# Patient Record
Sex: FEMALE | Race: WHITE | NOT HISPANIC OR LATINO
[De-identification: names, ages, dates, MRNs, and addresses within clinical notes are randomized per-mention and may not be internally consistent; named-entity substitution may affect disease eponyms.]

---

## 2017-02-02 ENCOUNTER — APPOINTMENT (OUTPATIENT)
Dept: ENDOCRINOLOGY | Facility: CLINIC | Age: 42
End: 2017-02-02

## 2017-02-02 VITALS
SYSTOLIC BLOOD PRESSURE: 128 MMHG | HEIGHT: 65 IN | WEIGHT: 156 LBS | DIASTOLIC BLOOD PRESSURE: 87 MMHG | BODY MASS INDEX: 25.99 KG/M2 | HEART RATE: 92 BPM

## 2017-02-24 ENCOUNTER — RX RENEWAL (OUTPATIENT)
Age: 42
End: 2017-02-24

## 2017-02-24 ENCOUNTER — TRANSCRIPTION ENCOUNTER (OUTPATIENT)
Age: 42
End: 2017-02-24

## 2017-02-27 ENCOUNTER — TRANSCRIPTION ENCOUNTER (OUTPATIENT)
Age: 42
End: 2017-02-27

## 2017-02-28 ENCOUNTER — TRANSCRIPTION ENCOUNTER (OUTPATIENT)
Age: 42
End: 2017-02-28

## 2017-02-28 ENCOUNTER — RX RENEWAL (OUTPATIENT)
Age: 42
End: 2017-02-28

## 2017-02-28 RX ORDER — INFUSION SET FOR INSULIN PUMP
INFUSION SETS-PARAPHERNALIA MISCELLANEOUS
Qty: 45 | Refills: 3 | Status: ACTIVE | COMMUNITY
Start: 2017-02-28 | End: 1900-01-01

## 2017-02-28 RX ORDER — SUB-Q INFUSION PUMP ACCESSORY
EACH MISCELLANEOUS
Qty: 45 | Refills: 3 | Status: ACTIVE | COMMUNITY
Start: 2017-02-28 | End: 1900-01-01

## 2017-06-12 ENCOUNTER — APPOINTMENT (OUTPATIENT)
Dept: OBGYN | Facility: CLINIC | Age: 42
End: 2017-06-12

## 2017-06-12 VITALS
DIASTOLIC BLOOD PRESSURE: 80 MMHG | WEIGHT: 156 LBS | SYSTOLIC BLOOD PRESSURE: 130 MMHG | HEIGHT: 65 IN | BODY MASS INDEX: 25.99 KG/M2

## 2017-06-12 DIAGNOSIS — Z31.9 ENCOUNTER FOR PROCREATIVE MANAGEMENT, UNSPECIFIED: ICD-10-CM

## 2017-06-15 ENCOUNTER — APPOINTMENT (OUTPATIENT)
Dept: ENDOCRINOLOGY | Facility: CLINIC | Age: 42
End: 2017-06-15

## 2017-06-15 VITALS
BODY MASS INDEX: 25.72 KG/M2 | DIASTOLIC BLOOD PRESSURE: 82 MMHG | HEART RATE: 94 BPM | SYSTOLIC BLOOD PRESSURE: 120 MMHG | WEIGHT: 156.25 LBS | HEIGHT: 65.5 IN

## 2017-06-15 DIAGNOSIS — E53.8 DEFICIENCY OF OTHER SPECIFIED B GROUP VITAMINS: ICD-10-CM

## 2017-06-15 DIAGNOSIS — O03.9 COMPLETE OR UNSPECIFIED SPONTANEOUS ABORTION W/OUT COMPLICATION: ICD-10-CM

## 2017-06-19 ENCOUNTER — RX RENEWAL (OUTPATIENT)
Age: 42
End: 2017-06-19

## 2017-06-19 ENCOUNTER — TRANSCRIPTION ENCOUNTER (OUTPATIENT)
Age: 42
End: 2017-06-19

## 2017-06-19 LAB — ANTI-MUELLERIAN HORMONE: 0.23 NG/ML

## 2017-06-20 ENCOUNTER — TRANSCRIPTION ENCOUNTER (OUTPATIENT)
Age: 42
End: 2017-06-20

## 2017-06-22 ENCOUNTER — TRANSCRIPTION ENCOUNTER (OUTPATIENT)
Age: 42
End: 2017-06-22

## 2017-06-26 ENCOUNTER — TRANSCRIPTION ENCOUNTER (OUTPATIENT)
Age: 42
End: 2017-06-26

## 2017-07-26 ENCOUNTER — APPOINTMENT (OUTPATIENT)
Dept: ENDOCRINOLOGY | Facility: CLINIC | Age: 42
End: 2017-07-26

## 2017-07-26 VITALS
HEIGHT: 65.5 IN | SYSTOLIC BLOOD PRESSURE: 123 MMHG | DIASTOLIC BLOOD PRESSURE: 89 MMHG | WEIGHT: 155 LBS | BODY MASS INDEX: 25.52 KG/M2 | HEART RATE: 97 BPM

## 2017-08-01 ENCOUNTER — CHART COPY (OUTPATIENT)
Age: 42
End: 2017-08-01

## 2017-08-01 ENCOUNTER — TRANSCRIPTION ENCOUNTER (OUTPATIENT)
Age: 42
End: 2017-08-01

## 2017-08-02 ENCOUNTER — TRANSCRIPTION ENCOUNTER (OUTPATIENT)
Age: 42
End: 2017-08-02

## 2017-08-04 ENCOUNTER — TRANSCRIPTION ENCOUNTER (OUTPATIENT)
Age: 42
End: 2017-08-04

## 2017-08-07 ENCOUNTER — TRANSCRIPTION ENCOUNTER (OUTPATIENT)
Age: 42
End: 2017-08-07

## 2017-08-11 ENCOUNTER — APPOINTMENT (OUTPATIENT)
Dept: HUMAN REPRODUCTION | Facility: CLINIC | Age: 42
End: 2017-08-11
Payer: COMMERCIAL

## 2017-08-11 PROCEDURE — 99204 OFFICE O/P NEW MOD 45 MIN: CPT | Mod: 25

## 2017-08-11 PROCEDURE — 36415 COLL VENOUS BLD VENIPUNCTURE: CPT

## 2017-08-11 PROCEDURE — 76830 TRANSVAGINAL US NON-OB: CPT

## 2017-08-14 ENCOUNTER — TRANSCRIPTION ENCOUNTER (OUTPATIENT)
Age: 42
End: 2017-08-14

## 2017-08-15 ENCOUNTER — TRANSCRIPTION ENCOUNTER (OUTPATIENT)
Age: 42
End: 2017-08-15

## 2017-08-16 ENCOUNTER — TRANSCRIPTION ENCOUNTER (OUTPATIENT)
Age: 42
End: 2017-08-16

## 2017-08-21 ENCOUNTER — RX RENEWAL (OUTPATIENT)
Age: 42
End: 2017-08-21

## 2017-09-05 ENCOUNTER — APPOINTMENT (OUTPATIENT)
Dept: OBGYN | Facility: CLINIC | Age: 42
End: 2017-09-05
Payer: COMMERCIAL

## 2017-09-05 ENCOUNTER — TRANSCRIPTION ENCOUNTER (OUTPATIENT)
Age: 42
End: 2017-09-05

## 2017-09-05 PROCEDURE — 99244 OFF/OP CNSLTJ NEW/EST MOD 40: CPT

## 2017-09-06 ENCOUNTER — TRANSCRIPTION ENCOUNTER (OUTPATIENT)
Age: 42
End: 2017-09-06

## 2017-09-19 ENCOUNTER — OUTPATIENT (OUTPATIENT)
Dept: OUTPATIENT SERVICES | Facility: HOSPITAL | Age: 42
LOS: 1 days | End: 2017-09-19
Payer: COMMERCIAL

## 2017-09-19 PROCEDURE — G0202: CPT | Mod: 26

## 2017-09-19 PROCEDURE — 77067 SCR MAMMO BI INCL CAD: CPT

## 2017-10-02 ENCOUNTER — APPOINTMENT (OUTPATIENT)
Dept: ENDOCRINOLOGY | Facility: CLINIC | Age: 42
End: 2017-10-02
Payer: COMMERCIAL

## 2017-10-02 VITALS
DIASTOLIC BLOOD PRESSURE: 81 MMHG | WEIGHT: 158 LBS | BODY MASS INDEX: 26.01 KG/M2 | SYSTOLIC BLOOD PRESSURE: 131 MMHG | HEART RATE: 86 BPM | HEIGHT: 65.5 IN

## 2017-10-02 PROCEDURE — 99214 OFFICE O/P EST MOD 30 MIN: CPT

## 2017-10-16 ENCOUNTER — APPOINTMENT (OUTPATIENT)
Dept: HUMAN REPRODUCTION | Facility: CLINIC | Age: 42
End: 2017-10-16
Payer: COMMERCIAL

## 2017-10-16 PROCEDURE — 99214 OFFICE O/P EST MOD 30 MIN: CPT

## 2017-10-24 ENCOUNTER — TRANSCRIPTION ENCOUNTER (OUTPATIENT)
Age: 42
End: 2017-10-24

## 2017-10-25 ENCOUNTER — TRANSCRIPTION ENCOUNTER (OUTPATIENT)
Age: 42
End: 2017-10-25

## 2017-10-26 ENCOUNTER — APPOINTMENT (OUTPATIENT)
Dept: ENDOCRINOLOGY | Facility: CLINIC | Age: 42
End: 2017-10-26

## 2017-11-14 ENCOUNTER — APPOINTMENT (OUTPATIENT)
Dept: ENDOCRINOLOGY | Facility: CLINIC | Age: 42
End: 2017-11-14

## 2017-11-21 VITALS
DIASTOLIC BLOOD PRESSURE: 84 MMHG | HEIGHT: 66 IN | WEIGHT: 156 LBS | HEART RATE: 102 BPM | SYSTOLIC BLOOD PRESSURE: 125 MMHG | BODY MASS INDEX: 25.07 KG/M2

## 2018-01-08 ENCOUNTER — APPOINTMENT (OUTPATIENT)
Dept: ENDOCRINOLOGY | Facility: CLINIC | Age: 43
End: 2018-01-08
Payer: COMMERCIAL

## 2018-01-08 VITALS
HEART RATE: 98 BPM | WEIGHT: 161.5 LBS | BODY MASS INDEX: 25.96 KG/M2 | DIASTOLIC BLOOD PRESSURE: 82 MMHG | HEIGHT: 66 IN | SYSTOLIC BLOOD PRESSURE: 140 MMHG

## 2018-01-08 PROCEDURE — 99214 OFFICE O/P EST MOD 30 MIN: CPT | Mod: 25

## 2018-01-08 PROCEDURE — 36415 COLL VENOUS BLD VENIPUNCTURE: CPT

## 2018-01-09 ENCOUNTER — TRANSCRIPTION ENCOUNTER (OUTPATIENT)
Age: 43
End: 2018-01-09

## 2018-01-10 ENCOUNTER — TRANSCRIPTION ENCOUNTER (OUTPATIENT)
Age: 43
End: 2018-01-10

## 2018-01-11 ENCOUNTER — TRANSCRIPTION ENCOUNTER (OUTPATIENT)
Age: 43
End: 2018-01-11

## 2018-01-11 LAB
24R-OH-CALCIDIOL SERPL-MCNC: 57.6 PG/ML
25(OH)D3 SERPL-MCNC: 32.3 NG/ML
ALBUMIN SERPL ELPH-MCNC: 4.1 G/DL
ALP BLD-CCNC: 78 U/L
ALT SERPL-CCNC: 27 U/L
ANION GAP SERPL CALC-SCNC: 13 MMOL/L
AST SERPL-CCNC: 34 U/L
BASOPHILS # BLD AUTO: 0.03 K/UL
BASOPHILS NFR BLD AUTO: 0.3 %
BILIRUB SERPL-MCNC: 0.2 MG/DL
BUN SERPL-MCNC: 15 MG/DL
CALCIUM SERPL-MCNC: 9.1 MG/DL
CHLORIDE SERPL-SCNC: 97 MMOL/L
CHOLEST SERPL-MCNC: 161 MG/DL
CHOLEST/HDLC SERPL: 5 RATIO
CO2 SERPL-SCNC: 25 MMOL/L
CREAT SERPL-MCNC: 0.69 MG/DL
CREAT SPEC-SCNC: 69 MG/DL
EOSINOPHIL # BLD AUTO: 0.42 K/UL
EOSINOPHIL NFR BLD AUTO: 3.5 %
GLUCOSE SERPL-MCNC: 122 MG/DL
HBA1C MFR BLD HPLC: 6.9 %
HCT VFR BLD CALC: 33.5 %
HDLC SERPL-MCNC: 32 MG/DL
HGB BLD-MCNC: 10.2 G/DL
IMM GRANULOCYTES NFR BLD AUTO: 0.1 %
LDLC SERPL CALC-MCNC: 68 MG/DL
LYMPHOCYTES # BLD AUTO: 4.06 K/UL
LYMPHOCYTES NFR BLD AUTO: 34.3 %
MAN DIFF?: NORMAL
MCHC RBC-ENTMCNC: 23 PG
MCHC RBC-ENTMCNC: 30.4 GM/DL
MCV RBC AUTO: 75.6 FL
MICROALBUMIN 24H UR DL<=1MG/L-MCNC: 0.7 MG/DL
MICROALBUMIN/CREAT 24H UR-RTO: 10 MG/G
MONOCYTES # BLD AUTO: 0.85 K/UL
MONOCYTES NFR BLD AUTO: 7.2 %
NEUTROPHILS # BLD AUTO: 6.47 K/UL
NEUTROPHILS NFR BLD AUTO: 54.6 %
PLATELET # BLD AUTO: 500 K/UL
POTASSIUM SERPL-SCNC: 4.3 MMOL/L
PROT SERPL-MCNC: 8.4 G/DL
RBC # BLD: 4.43 M/UL
RBC # FLD: 16.7 %
SODIUM SERPL-SCNC: 135 MMOL/L
T3FREE SERPL-MCNC: 2.71 PG/ML
T4 FREE SERPL-MCNC: 1.2 NG/DL
TRIGL SERPL-MCNC: 307 MG/DL
TSH SERPL-ACNC: 2.94 UIU/ML
VIT B12 SERPL-MCNC: 644 PG/ML
WBC # FLD AUTO: 11.84 K/UL

## 2018-01-16 ENCOUNTER — TRANSCRIPTION ENCOUNTER (OUTPATIENT)
Age: 43
End: 2018-01-16

## 2018-02-13 ENCOUNTER — TRANSCRIPTION ENCOUNTER (OUTPATIENT)
Age: 43
End: 2018-02-13

## 2018-04-11 ENCOUNTER — APPOINTMENT (OUTPATIENT)
Dept: ENDOCRINOLOGY | Facility: CLINIC | Age: 43
End: 2018-04-11
Payer: COMMERCIAL

## 2018-04-11 VITALS
HEART RATE: 78 BPM | DIASTOLIC BLOOD PRESSURE: 81 MMHG | HEIGHT: 66 IN | BODY MASS INDEX: 25.55 KG/M2 | SYSTOLIC BLOOD PRESSURE: 133 MMHG | WEIGHT: 159 LBS

## 2018-04-11 PROCEDURE — 99214 OFFICE O/P EST MOD 30 MIN: CPT

## 2018-04-11 RX ORDER — INSULIN PUMP SYRINGE, 3 ML
EACH MISCELLANEOUS
Qty: 45 | Refills: 3 | Status: ACTIVE | COMMUNITY
Start: 2017-02-28 | End: 1900-01-01

## 2018-04-12 ENCOUNTER — TRANSCRIPTION ENCOUNTER (OUTPATIENT)
Age: 43
End: 2018-04-12

## 2018-04-12 LAB
24R-OH-CALCIDIOL SERPL-MCNC: 55.8 PG/ML
25(OH)D3 SERPL-MCNC: 27.6 NG/ML
ALBUMIN SERPL ELPH-MCNC: 4.1 G/DL
ALP BLD-CCNC: 86 U/L
ALT SERPL-CCNC: 29 U/L
ANION GAP SERPL CALC-SCNC: 15 MMOL/L
AST SERPL-CCNC: 23 U/L
BASOPHILS # BLD AUTO: 0.02 K/UL
BASOPHILS NFR BLD AUTO: 0.2 %
BILIRUB SERPL-MCNC: 0.2 MG/DL
BUN SERPL-MCNC: 14 MG/DL
CALCIUM SERPL-MCNC: 9.4 MG/DL
CHLORIDE SERPL-SCNC: 96 MMOL/L
CHOLEST SERPL-MCNC: 191 MG/DL
CHOLEST/HDLC SERPL: 4.9 RATIO
CO2 SERPL-SCNC: 26 MMOL/L
CREAT SERPL-MCNC: 0.69 MG/DL
CREAT SPEC-SCNC: 92 MG/DL
EOSINOPHIL # BLD AUTO: 0.32 K/UL
EOSINOPHIL NFR BLD AUTO: 3.5 %
GLUCOSE SERPL-MCNC: 117 MG/DL
HBA1C MFR BLD HPLC: 6.9 %
HCT VFR BLD CALC: 37.2 %
HDLC SERPL-MCNC: 39 MG/DL
HGB BLD-MCNC: 12.1 G/DL
IMM GRANULOCYTES NFR BLD AUTO: 0.1 %
LDLC SERPL CALC-MCNC: 101 MG/DL
LYMPHOCYTES # BLD AUTO: 3.55 K/UL
LYMPHOCYTES NFR BLD AUTO: 39 %
MAN DIFF?: NORMAL
MCHC RBC-ENTMCNC: 26.4 PG
MCHC RBC-ENTMCNC: 32.5 GM/DL
MCV RBC AUTO: 81.2 FL
MICROALBUMIN 24H UR DL<=1MG/L-MCNC: 1.1 MG/DL
MICROALBUMIN/CREAT 24H UR-RTO: 12 MG/G
MONOCYTES # BLD AUTO: 0.75 K/UL
MONOCYTES NFR BLD AUTO: 8.2 %
NEUTROPHILS # BLD AUTO: 4.45 K/UL
NEUTROPHILS NFR BLD AUTO: 49 %
PLATELET # BLD AUTO: 441 K/UL
POTASSIUM SERPL-SCNC: 4.2 MMOL/L
PROT SERPL-MCNC: 7.5 G/DL
RBC # BLD: 4.58 M/UL
RBC # FLD: 17 %
SODIUM SERPL-SCNC: 137 MMOL/L
T3FREE SERPL-MCNC: 2.63 PG/ML
T4 FREE SERPL-MCNC: 1.3 NG/DL
TRIGL SERPL-MCNC: 253 MG/DL
TSH SERPL-ACNC: 2.1 UIU/ML
WBC # FLD AUTO: 9.1 K/UL

## 2018-04-13 ENCOUNTER — TRANSCRIPTION ENCOUNTER (OUTPATIENT)
Age: 43
End: 2018-04-13

## 2018-04-18 ENCOUNTER — TRANSCRIPTION ENCOUNTER (OUTPATIENT)
Age: 43
End: 2018-04-18

## 2018-04-19 ENCOUNTER — TRANSCRIPTION ENCOUNTER (OUTPATIENT)
Age: 43
End: 2018-04-19

## 2018-07-09 ENCOUNTER — APPOINTMENT (OUTPATIENT)
Dept: OBGYN | Facility: CLINIC | Age: 43
End: 2018-07-09
Payer: COMMERCIAL

## 2018-07-09 VITALS
SYSTOLIC BLOOD PRESSURE: 120 MMHG | WEIGHT: 162.13 LBS | HEIGHT: 66 IN | DIASTOLIC BLOOD PRESSURE: 80 MMHG | BODY MASS INDEX: 26.06 KG/M2

## 2018-07-09 PROCEDURE — 99211 OFF/OP EST MAY X REQ PHY/QHP: CPT

## 2018-07-09 RX ORDER — METFORMIN HYDROCHLORIDE 1000 MG/1
1000 TABLET, FILM COATED, EXTENDED RELEASE ORAL
Qty: 60 | Refills: 11 | Status: COMPLETED | COMMUNITY
Start: 2017-10-02 | End: 2018-07-09

## 2018-07-10 LAB — HPV HIGH+LOW RISK DNA PNL CVX: NOT DETECTED

## 2018-07-11 ENCOUNTER — APPOINTMENT (OUTPATIENT)
Dept: ENDOCRINOLOGY | Facility: CLINIC | Age: 43
End: 2018-07-11
Payer: COMMERCIAL

## 2018-07-11 VITALS
SYSTOLIC BLOOD PRESSURE: 109 MMHG | WEIGHT: 158 LBS | HEIGHT: 66 IN | BODY MASS INDEX: 25.39 KG/M2 | DIASTOLIC BLOOD PRESSURE: 74 MMHG | HEART RATE: 90 BPM

## 2018-07-11 LAB — PAP TEST: NORMAL

## 2018-07-11 PROCEDURE — 99214 OFFICE O/P EST MOD 30 MIN: CPT

## 2018-07-20 ENCOUNTER — TRANSCRIPTION ENCOUNTER (OUTPATIENT)
Age: 43
End: 2018-07-20

## 2018-08-23 ENCOUNTER — TRANSCRIPTION ENCOUNTER (OUTPATIENT)
Age: 43
End: 2018-08-23

## 2018-08-27 ENCOUNTER — TRANSCRIPTION ENCOUNTER (OUTPATIENT)
Age: 43
End: 2018-08-27

## 2018-08-28 ENCOUNTER — TRANSCRIPTION ENCOUNTER (OUTPATIENT)
Age: 43
End: 2018-08-28

## 2018-08-29 ENCOUNTER — TRANSCRIPTION ENCOUNTER (OUTPATIENT)
Age: 43
End: 2018-08-29

## 2018-08-30 ENCOUNTER — TRANSCRIPTION ENCOUNTER (OUTPATIENT)
Age: 43
End: 2018-08-30

## 2018-11-01 ENCOUNTER — TRANSCRIPTION ENCOUNTER (OUTPATIENT)
Age: 43
End: 2018-11-01

## 2018-11-01 ENCOUNTER — RX RENEWAL (OUTPATIENT)
Age: 43
End: 2018-11-01

## 2018-11-12 ENCOUNTER — APPOINTMENT (OUTPATIENT)
Dept: ENDOCRINOLOGY | Facility: CLINIC | Age: 43
End: 2018-11-12
Payer: COMMERCIAL

## 2018-11-12 VITALS
SYSTOLIC BLOOD PRESSURE: 133 MMHG | HEIGHT: 66 IN | WEIGHT: 161 LBS | DIASTOLIC BLOOD PRESSURE: 81 MMHG | BODY MASS INDEX: 25.88 KG/M2 | HEART RATE: 92 BPM

## 2018-11-12 LAB
GLUCOSE BLDC GLUCOMTR-MCNC: 101
GLUCOSE BLDC GLUCOMTR-MCNC: 7

## 2018-11-12 PROCEDURE — 82962 GLUCOSE BLOOD TEST: CPT

## 2018-11-12 PROCEDURE — 99214 OFFICE O/P EST MOD 30 MIN: CPT | Mod: 25

## 2018-11-12 RX ORDER — BUPROPION HYDROCHLORIDE 75 MG/1
75 TABLET, FILM COATED ORAL
Qty: 30 | Refills: 11 | Status: ACTIVE | COMMUNITY
Start: 2017-02-02

## 2018-11-13 LAB
24R-OH-CALCIDIOL SERPL-MCNC: 71.3 PG/ML
25(OH)D3 SERPL-MCNC: 24.9 NG/ML
ALBUMIN SERPL ELPH-MCNC: 4.3 G/DL
ALP BLD-CCNC: 82 U/L
ALT SERPL-CCNC: 24 U/L
ANION GAP SERPL CALC-SCNC: 10 MMOL/L
AST SERPL-CCNC: 20 U/L
BASOPHILS # BLD AUTO: 0.02 K/UL
BASOPHILS NFR BLD AUTO: 0.2 %
BILIRUB SERPL-MCNC: 0.2 MG/DL
BUN SERPL-MCNC: 13 MG/DL
CALCIUM SERPL-MCNC: 9.4 MG/DL
CHLORIDE SERPL-SCNC: 98 MMOL/L
CHOLEST SERPL-MCNC: 215 MG/DL
CHOLEST/HDLC SERPL: 6.5 RATIO
CO2 SERPL-SCNC: 28 MMOL/L
CREAT SERPL-MCNC: 0.56 MG/DL
CREAT SPEC-SCNC: 40 MG/DL
EOSINOPHIL # BLD AUTO: 0.36 K/UL
EOSINOPHIL NFR BLD AUTO: 4.1 %
GLUCOSE SERPL-MCNC: 90 MG/DL
HBA1C MFR BLD HPLC: 6.9 %
HCT VFR BLD CALC: 38.2 %
HDLC SERPL-MCNC: 33 MG/DL
HGB BLD-MCNC: 12.1 G/DL
IMM GRANULOCYTES NFR BLD AUTO: 0.2 %
LDLC SERPL CALC-MCNC: NORMAL
LYMPHOCYTES # BLD AUTO: 3.19 K/UL
LYMPHOCYTES NFR BLD AUTO: 36.4 %
MAN DIFF?: NORMAL
MCHC RBC-ENTMCNC: 26.3 PG
MCHC RBC-ENTMCNC: 31.7 GM/DL
MCV RBC AUTO: 83 FL
MICROALBUMIN 24H UR DL<=1MG/L-MCNC: <1.2 MG/DL
MICROALBUMIN/CREAT 24H UR-RTO: NORMAL
MONOCYTES # BLD AUTO: 0.61 K/UL
MONOCYTES NFR BLD AUTO: 7 %
NEUTROPHILS # BLD AUTO: 4.57 K/UL
NEUTROPHILS NFR BLD AUTO: 52.1 %
PLATELET # BLD AUTO: 460 K/UL
POTASSIUM SERPL-SCNC: 4.7 MMOL/L
PROT SERPL-MCNC: 7.1 G/DL
RBC # BLD: 4.6 M/UL
RBC # FLD: 14.2 %
SODIUM SERPL-SCNC: 136 MMOL/L
T3FREE SERPL-MCNC: 2.82 PG/ML
T4 FREE SERPL-MCNC: 1.3 NG/DL
TRIGL SERPL-MCNC: 448 MG/DL
TSH SERPL-ACNC: 2.07 UIU/ML
WBC # FLD AUTO: 8.77 K/UL

## 2018-11-27 ENCOUNTER — TRANSCRIPTION ENCOUNTER (OUTPATIENT)
Age: 43
End: 2018-11-27

## 2018-11-28 ENCOUNTER — TRANSCRIPTION ENCOUNTER (OUTPATIENT)
Age: 43
End: 2018-11-28

## 2018-12-18 ENCOUNTER — APPOINTMENT (OUTPATIENT)
Dept: ENDOCRINOLOGY | Facility: CLINIC | Age: 43
End: 2018-12-18
Payer: COMMERCIAL

## 2018-12-18 VITALS
DIASTOLIC BLOOD PRESSURE: 90 MMHG | HEART RATE: 90 BPM | BODY MASS INDEX: 26.03 KG/M2 | WEIGHT: 162 LBS | SYSTOLIC BLOOD PRESSURE: 130 MMHG | HEIGHT: 66 IN

## 2018-12-18 PROCEDURE — 99215 OFFICE O/P EST HI 40 MIN: CPT

## 2018-12-18 RX ORDER — ICOSAPENT ETHYL 1000 MG/1
1 CAPSULE ORAL
Qty: 360 | Refills: 3 | Status: ACTIVE | COMMUNITY
Start: 2018-12-18 | End: 1900-01-01

## 2019-01-04 ENCOUNTER — TRANSCRIPTION ENCOUNTER (OUTPATIENT)
Age: 44
End: 2019-01-04

## 2019-01-11 ENCOUNTER — TRANSCRIPTION ENCOUNTER (OUTPATIENT)
Age: 44
End: 2019-01-11

## 2019-01-30 ENCOUNTER — TRANSCRIPTION ENCOUNTER (OUTPATIENT)
Age: 44
End: 2019-01-30

## 2019-01-30 RX ORDER — OMEGA-3-ACID ETHYL ESTERS CAPSULES 1 G/1
1 CAPSULE, LIQUID FILLED ORAL
Qty: 120 | Refills: 11 | Status: ACTIVE | COMMUNITY
Start: 2019-01-30 | End: 1900-01-01

## 2019-01-31 ENCOUNTER — TRANSCRIPTION ENCOUNTER (OUTPATIENT)
Age: 44
End: 2019-01-31

## 2019-02-05 ENCOUNTER — TRANSCRIPTION ENCOUNTER (OUTPATIENT)
Age: 44
End: 2019-02-05

## 2019-02-12 ENCOUNTER — OTHER (OUTPATIENT)
Age: 44
End: 2019-02-12

## 2019-02-15 ENCOUNTER — TRANSCRIPTION ENCOUNTER (OUTPATIENT)
Age: 44
End: 2019-02-15

## 2019-02-19 ENCOUNTER — TRANSCRIPTION ENCOUNTER (OUTPATIENT)
Age: 44
End: 2019-02-19

## 2019-03-04 ENCOUNTER — TRANSCRIPTION ENCOUNTER (OUTPATIENT)
Age: 44
End: 2019-03-04

## 2019-03-12 ENCOUNTER — APPOINTMENT (OUTPATIENT)
Dept: ENDOCRINOLOGY | Facility: CLINIC | Age: 44
End: 2019-03-12
Payer: COMMERCIAL

## 2019-03-12 VITALS
SYSTOLIC BLOOD PRESSURE: 124 MMHG | WEIGHT: 163 LBS | BODY MASS INDEX: 26.2 KG/M2 | HEART RATE: 79 BPM | HEIGHT: 66 IN | DIASTOLIC BLOOD PRESSURE: 81 MMHG

## 2019-03-12 LAB — HBA1C MFR BLD HPLC: 6.8

## 2019-03-12 PROCEDURE — 99213 OFFICE O/P EST LOW 20 MIN: CPT | Mod: 25

## 2019-03-12 PROCEDURE — 83036 HEMOGLOBIN GLYCOSYLATED A1C: CPT | Mod: QW

## 2019-03-13 NOTE — HISTORY OF PRESENT ILLNESS
[FreeTextEntry1] : nov 11 2018\par POC a1c 7\par on pump compliant and metformin\par thyroid nodule stable optho UTD\par \par 12/2018: Former pt of Dr. Mcgill. Here for /fu, generally feels well and endorses no acute complaints. No interval events since LV. Today reports compliance w/ meds and insulin pump therapy as instructed. Concerned for hypertriglyceridemia, which has been an ongoing issue but worsened after statin cessation while she was trying to become pregnant. \par \par 3/2019: Here for /fu, generally feels well and endorses no acute complaints. No interval events since LV. Today reports euglycemia. No hypoglycemic episodes reported, she monitors 4-6 times daily. FSG in AM wnl ~120-140. Still notes occasional post prandial hyperglycemia ~180.\par She denies plans for pregnancy at this time. Denies early heart disease in her family or personally and reports no past pancreatitis. She otherwise denies any f/c, CP, SOB, palpitations, tremors, depressed mood, anxiety, palpitations, n/v, stool/urinary abn, skin/weight changes, heat/cold intolerance, HAs, breast/nipple changes, polyuria/polydipsia/nocturia or other complaints.\par \par July 11 2018\par POC a1c 6.6\par no hypos, feels good, no pain in feet, saw optho no DR\par weight is stable, diet is good, does not exercise\par \par \par april 11 2018\par here for f/u no complaitns\par feels well\par no pain in feet\par no wt gain\par no hypos\par saw optho\par \par \par jan 2018\par here for f/u no complaints \par still onn the pump \par POC A1c 6.9 improved no hypos\par wants to get pregnant but not now \par eats well\par no pain in feet\par \par jan 8 2018\par 40 yo F w/ hx of Dm2 on pump, multiple miscarriages, thyroid nodules stable,  reviewed pt's pump, SBG at goal. UTD w/ retinopathy screening\par \par hgA1c 7.2 , lno hypoglycemia , pt interested in fertility this time and that is why she went on the pump in the first place and is stayiing on it. She would otherwise rather change to lantus her total for the day is about 40 units so not very insulin resistant\par She denies any hypos\par Knowledgeable about pump but reports never was able to get below 7 and would like to be there while TTC\par

## 2019-03-13 NOTE — ASSESSMENT
[FreeTextEntry1] : Adjusted basal setting by lowering to 0.05 u/hr, titrations instructions provided as we will restart metformin 1000mg bid given ongoing weight gain.\par BP, controlled\par LDL controlled, but hyper TGL persistent. We reviewed treatment options. I advise to contine statin/tricor and start Omega 3 FA 2 gm BID for further CVD risk reduction and TGL control.\par optho and podiatry UTD for 2018.\par labs on NV. cont LT4 75 mcg fir now, proper intake reviewed\par rtc 3 months  [Carbohydrate Consistent Diet] : carbohydrate consistent diet [Hypoglycemia Management] : hypoglycemia management [Long Term Vascular Complications] : long term vascular complications of diabetes [Self Monitoring of Blood Glucose] : self monitoring of blood glucose [Levothyroxine] : The patient was instructed to take Levothyroxine on an empty stomach, separate from vitamins, and wait at least 30 minutes before eating

## 2019-03-28 ENCOUNTER — APPOINTMENT (OUTPATIENT)
Dept: ENDOCRINOLOGY | Facility: CLINIC | Age: 44
End: 2019-03-28
Payer: COMMERCIAL

## 2019-03-28 ENCOUNTER — TRANSCRIPTION ENCOUNTER (OUTPATIENT)
Age: 44
End: 2019-03-28

## 2019-03-28 ENCOUNTER — RESULT CHARGE (OUTPATIENT)
Age: 44
End: 2019-03-28

## 2019-03-28 VITALS
DIASTOLIC BLOOD PRESSURE: 71 MMHG | SYSTOLIC BLOOD PRESSURE: 109 MMHG | BODY MASS INDEX: 25.55 KG/M2 | WEIGHT: 159 LBS | HEART RATE: 85 BPM | HEIGHT: 66 IN

## 2019-03-28 PROCEDURE — 99211 OFF/OP EST MAY X REQ PHY/QHP: CPT | Mod: 25

## 2019-03-28 PROCEDURE — 82962 GLUCOSE BLOOD TEST: CPT

## 2019-04-01 LAB — GLUCOSE BLDC GLUCOMTR-MCNC: 101

## 2019-04-17 ENCOUNTER — TRANSCRIPTION ENCOUNTER (OUTPATIENT)
Age: 44
End: 2019-04-17

## 2019-04-19 ENCOUNTER — TRANSCRIPTION ENCOUNTER (OUTPATIENT)
Age: 44
End: 2019-04-19

## 2019-05-14 ENCOUNTER — APPOINTMENT (OUTPATIENT)
Dept: ENDOCRINOLOGY | Facility: CLINIC | Age: 44
End: 2019-05-14
Payer: COMMERCIAL

## 2019-05-14 VITALS
HEIGHT: 66 IN | HEART RATE: 78 BPM | WEIGHT: 165 LBS | SYSTOLIC BLOOD PRESSURE: 124 MMHG | BODY MASS INDEX: 26.52 KG/M2 | DIASTOLIC BLOOD PRESSURE: 80 MMHG

## 2019-05-14 PROCEDURE — 99215 OFFICE O/P EST HI 40 MIN: CPT

## 2019-05-15 ENCOUNTER — APPOINTMENT (OUTPATIENT)
Dept: OBGYN | Facility: CLINIC | Age: 44
End: 2019-05-15
Payer: COMMERCIAL

## 2019-05-15 VITALS
DIASTOLIC BLOOD PRESSURE: 60 MMHG | WEIGHT: 165 LBS | HEIGHT: 66 IN | SYSTOLIC BLOOD PRESSURE: 100 MMHG | BODY MASS INDEX: 26.52 KG/M2

## 2019-05-15 DIAGNOSIS — R39.9 UNSPECIFIED SYMPTOMS AND SIGNS INVOLVING THE GENITOURINARY SYSTEM: ICD-10-CM

## 2019-05-15 PROCEDURE — 99396 PREV VISIT EST AGE 40-64: CPT

## 2019-05-15 NOTE — PHYSICAL EXAM
[Awake] : awake [Alert] : alert [Acute Distress] : no acute distress [Mass] : no breast mass [Axillary LAD] : no axillary lymphadenopathy [Nipple Discharge] : no nipple discharge [Soft] : soft [Tender] : non tender [Oriented x3] : oriented to person, place, and time [Normal] : uterus [No Bleeding] : there was no active vaginal bleeding [Uterine Adnexae] : were not tender and not enlarged

## 2019-05-15 NOTE — COUNSELING
[Nutrition] : nutrition [Breast Self Exam] : breast self exam [Exercise] : exercise [Vitamins/Supplements] : vitamins/supplements [STD (testing, results, tx)] : STD (testing, results, tx) [Lab Results] : lab results

## 2019-05-15 NOTE — HISTORY OF PRESENT ILLNESS
[Definite:  ___ (Date)] : the last menstrual period was [unfilled] [Normal Amount/Duration] : was of a normal amount and duration [Regular Cycle Intervals] : periods have been regular [Frequency: Q ___ days] : menstrual periods occur approximately every [unfilled] days [Contraception] : uses contraception [Condoms] : uses condoms [Spotting Between  Menses] : no spotting between menses [Menstrual Cramps] : no menstrual cramps [On BCP at conception] : the patient was not on BCP at conception

## 2019-05-15 NOTE — CHIEF COMPLAINT
[Initial Visit] : initial GYN visit [FreeTextEntry1] : Patient is here for an initial office visit in reference to follow up urinary tract infection from PCP.

## 2019-05-16 LAB
APPEARANCE: CLEAR
BACTERIA: NEGATIVE
BILIRUBIN URINE: NEGATIVE
BLOOD URINE: NEGATIVE
COLOR: YELLOW
GLUCOSE QUALITATIVE U: NORMAL
HYALINE CASTS: 0 /LPF
KETONES URINE: NEGATIVE
LEUKOCYTE ESTERASE URINE: ABNORMAL
MICROSCOPIC-UA: NORMAL
NITRITE URINE: NEGATIVE
PH URINE: 5.5
PROTEIN URINE: NORMAL
RED BLOOD CELLS URINE: 2 /HPF
SPECIFIC GRAVITY URINE: 1.03
SQUAMOUS EPITHELIAL CELLS: 5 /HPF
URINE COMMENTS: NORMAL
UROBILINOGEN URINE: NORMAL
WHITE BLOOD CELLS URINE: 10 /HPF

## 2019-05-16 NOTE — ASSESSMENT
[Carbohydrate Consistent Diet] : carbohydrate consistent diet [FreeTextEntry1] : Adjusted basal setting by lowering to 0.05 u/hr, titrations instructions provided as we will increase metformin 1000mg bid given ongoing weight gain.\par BP, controlled\par LDL controlled, TGL improved on omega 3 FA. We reviewed treatment options. I advise to contine statin/tricor and continued Omega 3 FA 2 gm BID for further CVD risk reduction and TGL control.\par optho and podiatry UTD for 2019.\par TFTs at target on 5/2019. cont LT4 75 mcg fir now, proper intake reviewed. She is due for surveillance US for subcm nodules observed in 2017. referral provided\par rtc 3 months explained the potential high risk and toxicities with chronic insulin use including, but not limited to life threatening hypoglycemia and death, Verbalized understanding and agrees with treatment plan, will contact MD and seek emergency medical care if condition changes. Continue medtronic 670G.\par  [Long Term Vascular Complications] : long term vascular complications of diabetes [Hypoglycemia Management] : hypoglycemia management [Action and use of Insulin] : action and use of short and long-acting insulin [Importance of Diet and Exercise] : importance of diet and exercise to improve glycemic control, achieve weight loss and improve cardiovascular health [Self Monitoring of Blood Glucose] : self monitoring of blood glucose [Levothyroxine] : The patient was instructed to take Levothyroxine on an empty stomach, separate from vitamins, and wait at least 30 minutes before eating

## 2019-05-16 NOTE — PHYSICAL EXAM
[Alert] : alert [Well Nourished] : well nourished [No Acute Distress] : no acute distress [Well Developed] : well developed [Normal Sclera/Conjunctiva] : normal sclera/conjunctiva [No Proptosis] : no proptosis [EOMI] : extra ocular movement intact [Normal Oropharynx] : the oropharynx was normal [Thyroid Not Enlarged] : the thyroid was not enlarged [No Thyroid Nodules] : there were no palpable thyroid nodules [No Respiratory Distress] : no respiratory distress [No Accessory Muscle Use] : no accessory muscle use [Clear to Auscultation] : lungs were clear to auscultation bilaterally [Normal Rate] : heart rate was normal  [Regular Rhythm] : with a regular rhythm [Normal S1, S2] : normal S1 and S2 [No Edema] : there was no peripheral edema [Pedal Pulses Normal] : the pedal pulses are present [Normal Bowel Sounds] : normal bowel sounds [Not Tender] : non-tender [Not Distended] : not distended [Post Cervical Nodes] : posterior cervical nodes [Soft] : abdomen soft [Axillary Nodes] : axillary nodes [Anterior Cervical Nodes] : anterior cervical nodes [Normal] : normal and non tender [No Spinal Tenderness] : no spinal tenderness [Normal Gait] : normal gait [No Stigmata of Cushings Syndrome] : no stigmata of cushings syndrome [Spine Straight] : spine straight [No Rash] : no rash [Normal Strength/Tone] : muscle strength and tone were normal [Normal Reflexes] : deep tendon reflexes were 2+ and symmetric [Acanthosis Nigricans] : no acanthosis nigricans [No Tremors] : no tremors [Oriented x3] : oriented to person, place, and time

## 2019-05-16 NOTE — HISTORY OF PRESENT ILLNESS
[FreeTextEntry1] : nov 11 2018\par POC a1c 7\par on pump compliant and metformin\par thyroid nodule stable optho UTD\par \par 12/2018: Former pt of Dr. Mcgill. Here for /fu, generally feels well and endorses no acute complaints. No interval events since LV. Today reports compliance w/ meds and insulin pump therapy as instructed. Concerned for hypertriglyceridemia, which has been an ongoing issue but worsened after statin cessation while she was trying to become pregnant. \par \par 3/2019: Here for /fu, generally feels well and endorses no acute complaints. No interval events since LV. Today reports euglycemia. No hypoglycemic episodes reported, she monitors 4-6 times daily. FSG in AM wnl ~120-140. Still notes occasional post prandial hyperglycemia ~180.\par \par \par 5/2019: Here for /fu, generally feels well and endorses no acute complaints. No interval events since LV. Today pt is tearful and distressed about weight gain and increase in A1C to 7.3%. Lipid panel shows LDL < 100 and TGL <200. Other labs at goal. She tolerated metformin but has only been taking a single 500 mg PO tablet w/o GI s.e. \par She denies plans for pregnancy at this time. Denies early heart disease in her family or personally and reports no past pancreatitis. She otherwise denies any f/c, CP, SOB, palpitations, tremors, depressed mood, anxiety, palpitations, n/v, stool/urinary abn, skin/weight changes, heat/cold intolerance, HAs, breast/nipple changes, polyuria/polydipsia/nocturia or other complaints.\par \par July 11 2018\par POC a1c 6.6\par no hypos, feels good, no pain in feet, saw optho no DR\par weight is stable, diet is good, does not exercise\par \par \par april 11 2018\par here for f/u no complaitns\par feels well\par no pain in feet\par no wt gain\par no hypos\par saw optho\par \par \par jan 2018\par here for f/u no complaints \par still onn the pump \par POC A1c 6.9 improved no hypos\par wants to get pregnant but not now \par eats well\par no pain in feet\par \par jan 8 2018\par 42 yo F w/ hx of Dm2 on pump, multiple miscarriages, thyroid nodules stable,  reviewed pt's pump, SBG at goal. UTD w/ retinopathy screening\par \par hgA1c 7.2 , lno hypoglycemia , pt interested in fertility this time and that is why she went on the pump in the first place and is stayiing on it. She would otherwise rather change to lantus her total for the day is about 40 units so not very insulin resistant\par She denies any hypos\par Knowledgeable about pump but reports never was able to get below 7 and would like to be there while TTC\par

## 2019-05-17 LAB — BACTERIA UR CULT: NORMAL

## 2019-05-20 LAB — CYTOLOGY CVX/VAG DOC THIN PREP: NORMAL

## 2019-05-22 ENCOUNTER — TRANSCRIPTION ENCOUNTER (OUTPATIENT)
Age: 44
End: 2019-05-22

## 2019-05-23 ENCOUNTER — TRANSCRIPTION ENCOUNTER (OUTPATIENT)
Age: 44
End: 2019-05-23

## 2019-06-03 ENCOUNTER — APPOINTMENT (OUTPATIENT)
Dept: MAMMOGRAPHY | Facility: CLINIC | Age: 44
End: 2019-06-03
Payer: COMMERCIAL

## 2019-06-03 ENCOUNTER — OUTPATIENT (OUTPATIENT)
Dept: OUTPATIENT SERVICES | Facility: HOSPITAL | Age: 44
LOS: 1 days | End: 2019-06-03

## 2019-06-03 PROCEDURE — 77063 BREAST TOMOSYNTHESIS BI: CPT | Mod: 26

## 2019-06-03 PROCEDURE — 77067 SCR MAMMO BI INCL CAD: CPT | Mod: 26

## 2019-06-17 ENCOUNTER — TRANSCRIPTION ENCOUNTER (OUTPATIENT)
Age: 44
End: 2019-06-17

## 2019-06-18 ENCOUNTER — TRANSCRIPTION ENCOUNTER (OUTPATIENT)
Age: 44
End: 2019-06-18

## 2019-06-28 ENCOUNTER — TRANSCRIPTION ENCOUNTER (OUTPATIENT)
Age: 44
End: 2019-06-28

## 2019-07-01 ENCOUNTER — TRANSCRIPTION ENCOUNTER (OUTPATIENT)
Age: 44
End: 2019-07-01

## 2019-08-06 ENCOUNTER — TRANSCRIPTION ENCOUNTER (OUTPATIENT)
Age: 44
End: 2019-08-06

## 2019-08-06 ENCOUNTER — RX RENEWAL (OUTPATIENT)
Age: 44
End: 2019-08-06

## 2019-08-07 ENCOUNTER — TRANSCRIPTION ENCOUNTER (OUTPATIENT)
Age: 44
End: 2019-08-07

## 2019-08-08 ENCOUNTER — TRANSCRIPTION ENCOUNTER (OUTPATIENT)
Age: 44
End: 2019-08-08

## 2019-09-04 ENCOUNTER — APPOINTMENT (OUTPATIENT)
Dept: ENDOCRINOLOGY | Facility: CLINIC | Age: 44
End: 2019-09-04
Payer: COMMERCIAL

## 2019-09-04 VITALS
SYSTOLIC BLOOD PRESSURE: 114 MMHG | DIASTOLIC BLOOD PRESSURE: 80 MMHG | HEART RATE: 99 BPM | BODY MASS INDEX: 25.66 KG/M2 | WEIGHT: 159 LBS

## 2019-09-04 LAB
GLUCOSE BLDC GLUCOMTR-MCNC: 159
HBA1C MFR BLD HPLC: 6.9

## 2019-09-04 PROCEDURE — 82962 GLUCOSE BLOOD TEST: CPT

## 2019-09-04 PROCEDURE — 99215 OFFICE O/P EST HI 40 MIN: CPT

## 2019-09-04 PROCEDURE — 83036 HEMOGLOBIN GLYCOSYLATED A1C: CPT | Mod: QW

## 2019-09-06 NOTE — HISTORY OF PRESENT ILLNESS
[FreeTextEntry1] : nov 11 2018\par POC a1c 7\par on pump compliant and metformin\par thyroid nodule stable optho UTD\par \par 12/2018: Former pt of Dr. Mcgill. Here for /fu, generally feels well and endorses no acute complaints. No interval events since LV. Today reports compliance w/ meds and insulin pump therapy as instructed. Concerned for hypertriglyceridemia, which has been an ongoing issue but worsened after statin cessation while she was trying to become pregnant. \par \par 3/2019: Here for /fu, generally feels well and endorses no acute complaints. No interval events since LV. Today reports euglycemia. No hypoglycemic episodes reported, she monitors 4-6 times daily. FSG in AM wnl ~120-140. Still notes occasional post prandial hyperglycemia ~180.\par \par \par 5/2019: Here for /fu, generally feels well and endorses no acute complaints. No interval events since LV. Today pt is tearful and distressed about weight gain and increase in A1C to 7.3%. Lipid panel shows LDL < 100 and TGL <200. Other labs at goal. She tolerated metformin but has only been taking a single 500 mg PO tablet w/o GI s.e. \par She denies plans for pregnancy at this time. Denies early heart disease in her family or personally and reports no past pancreatitis. She otherwise denies any f/c, CP, SOB, palpitations, tremors, depressed mood, anxiety, palpitations, n/v, stool/urinary abn, skin/weight changes, heat/cold intolerance, HAs, breast/nipple changes, polyuria/polydipsia/nocturia or other complaints.\par \par July 11 2018\par POC a1c 6.6\par no hypos, feels good, no pain in feet, saw optho no DR\par weight is stable, diet is good, does not exercise\par \par \par april 11 2018\par here for f/u no complaitns\par feels well\par no pain in feet\par no wt gain\par no hypos\par saw optho\par \par \par jan 2018\par here for f/u no complaints \par still onn the pump \par POC A1c 6.9 improved no hypos\par wants to get pregnant but not now \par eats well\par no pain in feet\par \par jan 8 2018\par 40 yo F w/ hx of Dm2 on pump, multiple miscarriages, thyroid nodules stable,  reviewed pt's pump, SBG at goal. UTD w/ retinopathy screening\par \par hgA1c 7.2 , lno hypoglycemia , pt interested in fertility this time and that is why she went on the pump in the first place and is stayiing on it. She would otherwise rather change to lantus her total for the day is about 40 units so not very insulin resistant\par She denies any hypos\par Knowledgeable about pump but reports never was able to get below 7 and would like to be there while TTC\par

## 2019-09-06 NOTE — PHYSICAL EXAM
[Alert] : alert [No Acute Distress] : no acute distress [Well Developed] : well developed [Well Nourished] : well nourished [Normal Sclera/Conjunctiva] : normal sclera/conjunctiva [EOMI] : extra ocular movement intact [Normal Oropharynx] : the oropharynx was normal [No Proptosis] : no proptosis [Thyroid Not Enlarged] : the thyroid was not enlarged [No Thyroid Nodules] : there were no palpable thyroid nodules [No Accessory Muscle Use] : no accessory muscle use [No Respiratory Distress] : no respiratory distress [Normal Rate] : heart rate was normal  [Clear to Auscultation] : lungs were clear to auscultation bilaterally [Regular Rhythm] : with a regular rhythm [Normal S1, S2] : normal S1 and S2 [No Edema] : there was no peripheral edema [Pedal Pulses Normal] : the pedal pulses are present [Not Tender] : non-tender [Normal Bowel Sounds] : normal bowel sounds [Not Distended] : not distended [Soft] : abdomen soft [Anterior Cervical Nodes] : anterior cervical nodes [Post Cervical Nodes] : posterior cervical nodes [Axillary Nodes] : axillary nodes [Normal] : normal and non tender [Spine Straight] : spine straight [No Spinal Tenderness] : no spinal tenderness [Normal Gait] : normal gait [No Stigmata of Cushings Syndrome] : no stigmata of cushings syndrome [Normal Strength/Tone] : muscle strength and tone were normal [No Rash] : no rash [Acanthosis Nigricans] : no acanthosis nigricans [Normal Reflexes] : deep tendon reflexes were 2+ and symmetric [Oriented x3] : oriented to person, place, and time [No Tremors] : no tremors

## 2019-09-06 NOTE — ASSESSMENT
[FreeTextEntry1] : Adjusted basal setting by lowering to 0.05 u/hr, titrations instructions provided as we will increase metformin 1000mg bid given ongoing weight gain.\par BP, controlled\par LDL controlled, TGL improved on omega 3 FA. We reviewed treatment options. I advise to contine statin/tricor and continued Omega 3 FA 2 gm BID for further CVD risk reduction and TGL control.\par optho and podiatry UTD for 2019.\par TFTs at target on 5/2019. cont LT4 75 mcg fir now, proper intake reviewed. She is due for surveillance US for subcm nodules observed in 2017. referral provided\par rtc 3 months explained the potential high risk and toxicities with chronic insulin use including, but not limited to life threatening hypoglycemia and death, Verbalized understanding and agrees with treatment plan, will contact MD and seek emergency medical care if condition changes. Continue medtronic 670G.\par  [Carbohydrate Consistent Diet] : carbohydrate consistent diet [Hypoglycemia Management] : hypoglycemia management [Long Term Vascular Complications] : long term vascular complications of diabetes [Importance of Diet and Exercise] : importance of diet and exercise to improve glycemic control, achieve weight loss and improve cardiovascular health [Self Monitoring of Blood Glucose] : self monitoring of blood glucose [Action and use of Insulin] : action and use of short and long-acting insulin [Levothyroxine] : The patient was instructed to take Levothyroxine on an empty stomach, separate from vitamins, and wait at least 30 minutes before eating

## 2019-09-06 NOTE — PHYSICAL EXAM
[No Acute Distress] : no acute distress [Alert] : alert [Well Nourished] : well nourished [Well Developed] : well developed [Normal Sclera/Conjunctiva] : normal sclera/conjunctiva [Normal Oropharynx] : the oropharynx was normal [No Proptosis] : no proptosis [EOMI] : extra ocular movement intact [Thyroid Not Enlarged] : the thyroid was not enlarged [No Respiratory Distress] : no respiratory distress [No Thyroid Nodules] : there were no palpable thyroid nodules [No Accessory Muscle Use] : no accessory muscle use [Clear to Auscultation] : lungs were clear to auscultation bilaterally [Normal Rate] : heart rate was normal  [Regular Rhythm] : with a regular rhythm [Normal S1, S2] : normal S1 and S2 [Pedal Pulses Normal] : the pedal pulses are present [No Edema] : there was no peripheral edema [Normal Bowel Sounds] : normal bowel sounds [Not Tender] : non-tender [Soft] : abdomen soft [Not Distended] : not distended [Anterior Cervical Nodes] : anterior cervical nodes [Post Cervical Nodes] : posterior cervical nodes [Normal] : normal and non tender [Axillary Nodes] : axillary nodes [Spine Straight] : spine straight [No Spinal Tenderness] : no spinal tenderness [No Stigmata of Cushings Syndrome] : no stigmata of cushings syndrome [Normal Gait] : normal gait [Normal Strength/Tone] : muscle strength and tone were normal [No Rash] : no rash [Normal Reflexes] : deep tendon reflexes were 2+ and symmetric [Acanthosis Nigricans] : no acanthosis nigricans [Oriented x3] : oriented to person, place, and time [No Tremors] : no tremors

## 2019-09-11 ENCOUNTER — TRANSCRIPTION ENCOUNTER (OUTPATIENT)
Age: 44
End: 2019-09-11

## 2019-09-12 ENCOUNTER — TRANSCRIPTION ENCOUNTER (OUTPATIENT)
Age: 44
End: 2019-09-12

## 2019-09-27 ENCOUNTER — TRANSCRIPTION ENCOUNTER (OUTPATIENT)
Age: 44
End: 2019-09-27

## 2019-10-04 ENCOUNTER — TRANSCRIPTION ENCOUNTER (OUTPATIENT)
Age: 44
End: 2019-10-04

## 2019-11-14 ENCOUNTER — TRANSCRIPTION ENCOUNTER (OUTPATIENT)
Age: 44
End: 2019-11-14

## 2019-11-15 ENCOUNTER — TRANSCRIPTION ENCOUNTER (OUTPATIENT)
Age: 44
End: 2019-11-15

## 2019-12-05 ENCOUNTER — APPOINTMENT (OUTPATIENT)
Dept: ENDOCRINOLOGY | Facility: CLINIC | Age: 44
End: 2019-12-05
Payer: COMMERCIAL

## 2019-12-05 VITALS
DIASTOLIC BLOOD PRESSURE: 76 MMHG | BODY MASS INDEX: 25.88 KG/M2 | HEIGHT: 66 IN | HEART RATE: 85 BPM | WEIGHT: 161 LBS | SYSTOLIC BLOOD PRESSURE: 117 MMHG

## 2019-12-05 LAB
GLUCOSE BLDC GLUCOMTR-MCNC: 92
HBA1C MFR BLD HPLC: 6.1

## 2019-12-05 PROCEDURE — 82962 GLUCOSE BLOOD TEST: CPT

## 2019-12-05 PROCEDURE — 83036 HEMOGLOBIN GLYCOSYLATED A1C: CPT | Mod: QW

## 2019-12-05 PROCEDURE — 99215 OFFICE O/P EST HI 40 MIN: CPT | Mod: 25

## 2019-12-05 RX ORDER — LISINOPRIL 2.5 MG/1
2.5 TABLET ORAL DAILY
Qty: 90 | Refills: 3 | Status: COMPLETED | COMMUNITY
Start: 2018-01-08 | End: 2019-12-05

## 2019-12-06 NOTE — HISTORY OF PRESENT ILLNESS
[FreeTextEntry1] : nov 11 2018\par POC a1c 7\par on pump compliant and metformin\par thyroid nodule stable optho UTD\par \par 12/2018: Former pt of Dr. Mcgill. Here for /fu, generally feels well and endorses no acute complaints. No interval events since LV. Today reports compliance w/ meds and insulin pump therapy as instructed. Concerned for hypertriglyceridemia, which has been an ongoing issue but worsened after statin cessation while she was trying to become pregnant. \par \par 3/2019: Here for /fu, generally feels well and endorses no acute complaints. No interval events since LV. Today reports euglycemia. No hypoglycemic episodes reported, she monitors 4-6 times daily. FSG in AM wnl ~120-140. Still notes occasional post prandial hyperglycemia ~180.\par \par \par 12/2019: Here for /fu, generally feels well and endorses no acute complaints. No interval events since LV. Today pt is happy about weight loss and improved A1C. Lipid panel shows LDL < 100 and TGL <200. Other labs at goal. She tolerated metformin max dose w/o GI s.e. \par She denies plans for pregnancy at this time. Denies early heart disease in her family or personally and reports no past pancreatitis. She otherwise denies any f/c, CP, SOB, palpitations, tremors, depressed mood, anxiety, palpitations, n/v, stool/urinary abn, skin/weight changes, heat/cold intolerance, HAs, breast/nipple changes, polyuria/polydipsia/nocturia or other complaints.\par \par July 11 2018\par POC a1c 6.6\par no hypos, feels good, no pain in feet, saw optho no DR\par weight is stable, diet is good, does not exercise\par \par \par april 11 2018\par here for f/u no complaitns\par feels well\par no pain in feet\par no wt gain\par no hypos\par saw optho\par \par \par jan 2018\par here for f/u no complaints \par still onn the pump \par POC A1c 6.9 improved no hypos\par wants to get pregnant but not now \par eats well\par no pain in feet\par \par jan 8 2018\par 40 yo F w/ hx of Dm2 on pump, multiple miscarriages, thyroid nodules stable,  reviewed pt's pump, SBG at goal. UTD w/ retinopathy screening\par \par hgA1c 7.2 , lno hypoglycemia , pt interested in fertility this time and that is why she went on the pump in the first place and is stayiing on it. She would otherwise rather change to lantus her total for the day is about 40 units so not very insulin resistant\par She denies any hypos\par Knowledgeable about pump but reports never was able to get below 7 and would like to be there while TTC\par

## 2019-12-06 NOTE — PHYSICAL EXAM
[No Acute Distress] : no acute distress [Alert] : alert [Normal Sclera/Conjunctiva] : normal sclera/conjunctiva [Well Nourished] : well nourished [Well Developed] : well developed [No Proptosis] : no proptosis [EOMI] : extra ocular movement intact [Normal Oropharynx] : the oropharynx was normal [No Accessory Muscle Use] : no accessory muscle use [No Thyroid Nodules] : there were no palpable thyroid nodules [Thyroid Not Enlarged] : the thyroid was not enlarged [No Respiratory Distress] : no respiratory distress [Clear to Auscultation] : lungs were clear to auscultation bilaterally [Normal Rate] : heart rate was normal  [Normal S1, S2] : normal S1 and S2 [Pedal Pulses Normal] : the pedal pulses are present [Regular Rhythm] : with a regular rhythm [Normal Bowel Sounds] : normal bowel sounds [Soft] : abdomen soft [Not Tender] : non-tender [No Edema] : there was no peripheral edema [Anterior Cervical Nodes] : anterior cervical nodes [Post Cervical Nodes] : posterior cervical nodes [Not Distended] : not distended [No Spinal Tenderness] : no spinal tenderness [Axillary Nodes] : axillary nodes [Normal] : normal and non tender [Normal Gait] : normal gait [Spine Straight] : spine straight [No Stigmata of Cushings Syndrome] : no stigmata of cushings syndrome [Acanthosis Nigricans] : no acanthosis nigricans [Normal Strength/Tone] : muscle strength and tone were normal [No Rash] : no rash [Normal Reflexes] : deep tendon reflexes were 2+ and symmetric [No Tremors] : no tremors [Oriented x3] : oriented to person, place, and time

## 2019-12-06 NOTE — ASSESSMENT
[FreeTextEntry1] : Basal rate now adequate, no further hypoglycemia\par BP, controlled\par LDL controlled, TGL improved on omega 3 FA. We reviewed treatment options. I advise to contine statin/tricor and continued Omega 3 FA 2 gm BID for further CVD risk reduction and TGL control.\par optho and podiatry UTD for 2019.\par TFTs at target on 5/2019. cont LT4 75 mcg fir now, proper intake reviewed. She is due for surveillance US for subcm nodules observed in 2017. referral provided\par rtc 3 months explained the potential high risk and toxicities with chronic insulin use including, but not limited to life threatening hypoglycemia and death, Verbalized understanding and agrees with treatment plan, will contact MD and seek emergency medical care if condition changes. Continue medtronic 670G.\par  [Carbohydrate Consistent Diet] : carbohydrate consistent diet [Hypoglycemia Management] : hypoglycemia management [Long Term Vascular Complications] : long term vascular complications of diabetes [Importance of Diet and Exercise] : importance of diet and exercise to improve glycemic control, achieve weight loss and improve cardiovascular health [Action and use of Insulin] : action and use of short and long-acting insulin [Self Monitoring of Blood Glucose] : self monitoring of blood glucose [Levothyroxine] : The patient was instructed to take Levothyroxine on an empty stomach, separate from vitamins, and wait at least 30 minutes before eating

## 2019-12-30 ENCOUNTER — TRANSCRIPTION ENCOUNTER (OUTPATIENT)
Age: 44
End: 2019-12-30

## 2020-01-15 ENCOUNTER — TRANSCRIPTION ENCOUNTER (OUTPATIENT)
Age: 45
End: 2020-01-15

## 2020-01-16 ENCOUNTER — TRANSCRIPTION ENCOUNTER (OUTPATIENT)
Age: 45
End: 2020-01-16

## 2020-03-10 ENCOUNTER — APPOINTMENT (OUTPATIENT)
Dept: ENDOCRINOLOGY | Facility: CLINIC | Age: 45
End: 2020-03-10
Payer: COMMERCIAL

## 2020-03-10 VITALS
HEIGHT: 66 IN | WEIGHT: 159 LBS | BODY MASS INDEX: 25.55 KG/M2 | SYSTOLIC BLOOD PRESSURE: 121 MMHG | HEART RATE: 79 BPM | DIASTOLIC BLOOD PRESSURE: 84 MMHG

## 2020-03-10 LAB — GLUCOSE BLDC GLUCOMTR-MCNC: 109

## 2020-03-10 PROCEDURE — 82962 GLUCOSE BLOOD TEST: CPT

## 2020-03-10 PROCEDURE — 99215 OFFICE O/P EST HI 40 MIN: CPT | Mod: 25

## 2020-03-13 NOTE — HISTORY OF PRESENT ILLNESS
[FreeTextEntry1] : nov 11 2018\par POC a1c 7\par on pump compliant and metformin\par thyroid nodule stable optho UTD\par \par 12/2018: Former pt of Dr. Mcgill. Here for /fu, generally feels well and endorses no acute complaints. No interval events since LV. Today reports compliance w/ meds and insulin pump therapy as instructed. Concerned for hypertriglyceridemia, which has been an ongoing issue but worsened after statin cessation while she was trying to become pregnant. \par \par 3/2019: Here for /fu, generally feels well and endorses no acute complaints. No interval events since LV. Today reports euglycemia. No hypoglycemic episodes reported, she monitors 4-6 times daily. FSG in AM wnl ~120-140. Still notes occasional post prandial hyperglycemia ~180.\par \par \par 3/20: Here for /fu, generally feels well and endorses no acute complaints. No interval events since LV. Today pt is happy about weight loss and improved A1C. Lipid panel shows LDL < 100 and TGL <200. Other labs at goal. She tolerated metformin max dose w/o GI s.e. \par She denies plans for pregnancy at this time. Denies early heart disease in her family or personally and reports no past pancreatitis. She otherwise denies any f/c, CP, SOB, palpitations, tremors, depressed mood, anxiety, palpitations, n/v, stool/urinary abn, skin/weight changes, heat/cold intolerance, HAs, breast/nipple changes, polyuria/polydipsia/nocturia or other complaints.\par \par July 11 2018\par POC a1c 6.6\par no hypos, feels good, no pain in feet, saw optho no DR\par weight is stable, diet is good, does not exercise\par \par \par april 11 2018\par here for f/u no complaitns\par feels well\par no pain in feet\par no wt gain\par no hypos\par saw optho\par \par \par jan 2018\par here for f/u no complaints \par still onn the pump \par POC A1c 6.9 improved no hypos\par wants to get pregnant but not now \par eats well\par no pain in feet\par \par jan 8 2018\par 42 yo F w/ hx of Dm2 on pump, multiple miscarriages, thyroid nodules stable,  reviewed pt's pump, SBG at goal. UTD w/ retinopathy screening\par \par hgA1c 7.2 , lno hypoglycemia , pt interested in fertility this time and that is why she went on the pump in the first place and is stayiing on it. She would otherwise rather change to lantus her total for the day is about 40 units so not very insulin resistant\par She denies any hypos\par Knowledgeable about pump but reports never was able to get below 7 and would like to be there while TTC\par

## 2020-03-13 NOTE — ASSESSMENT
[FreeTextEntry1] : Basal rate now adequate, no further hypoglycemia\par BP, controlled\par LDL controlled, TGL improved on omega 3 FA. We reviewed treatment options. I advise to contine statin/tricor and continued Omega 3 FA 2 gm BID for further CVD risk reduction and TGL control.\par optho and podiatry UTD for 2019.\par TFTs at target on 5/2019. cont LT4 75 mcg fir now, proper intake reviewed. US in 6/2019 w/ stable sub cm nodules.\par rtc 3 months explained the potential high risk and toxicities with chronic insulin use including, but not limited to life threatening hypoglycemia and death, Verbalized understanding and agrees with treatment plan, will contact MD and seek emergency medical care if condition changes. Continue medtronic 670G. explained the potential high risk and toxicities with chronic insulin use including, but not limited to life threatening hypoglycemia and death, Verbalized understanding and agrees with treatment plan, will contact MD and seek emergency medical care if condition changes.\par \par  [Carbohydrate Consistent Diet] : carbohydrate consistent diet [Hypoglycemia Management] : hypoglycemia management [Long Term Vascular Complications] : long term vascular complications of diabetes [Importance of Diet and Exercise] : importance of diet and exercise to improve glycemic control, achieve weight loss and improve cardiovascular health [Action and use of Insulin] : action and use of short and long-acting insulin [Self Monitoring of Blood Glucose] : self monitoring of blood glucose [Levothyroxine] : The patient was instructed to take Levothyroxine on an empty stomach, separate from vitamins, and wait at least 30 minutes before eating

## 2020-05-12 ENCOUNTER — TRANSCRIPTION ENCOUNTER (OUTPATIENT)
Age: 45
End: 2020-05-12

## 2020-05-13 ENCOUNTER — TRANSCRIPTION ENCOUNTER (OUTPATIENT)
Age: 45
End: 2020-05-13

## 2020-05-14 ENCOUNTER — RX RENEWAL (OUTPATIENT)
Age: 45
End: 2020-05-14

## 2020-05-15 ENCOUNTER — TRANSCRIPTION ENCOUNTER (OUTPATIENT)
Age: 45
End: 2020-05-15

## 2020-06-02 ENCOUNTER — TRANSCRIPTION ENCOUNTER (OUTPATIENT)
Age: 45
End: 2020-06-02

## 2020-06-03 ENCOUNTER — TRANSCRIPTION ENCOUNTER (OUTPATIENT)
Age: 45
End: 2020-06-03

## 2020-06-11 ENCOUNTER — TRANSCRIPTION ENCOUNTER (OUTPATIENT)
Age: 45
End: 2020-06-11

## 2020-06-15 ENCOUNTER — TRANSCRIPTION ENCOUNTER (OUTPATIENT)
Age: 45
End: 2020-06-15

## 2020-06-16 ENCOUNTER — TRANSCRIPTION ENCOUNTER (OUTPATIENT)
Age: 45
End: 2020-06-16

## 2020-06-25 ENCOUNTER — APPOINTMENT (OUTPATIENT)
Dept: ENDOCRINOLOGY | Facility: CLINIC | Age: 45
End: 2020-06-25
Payer: COMMERCIAL

## 2020-06-25 VITALS
BODY MASS INDEX: 24.43 KG/M2 | WEIGHT: 152 LBS | DIASTOLIC BLOOD PRESSURE: 72 MMHG | HEART RATE: 83 BPM | HEIGHT: 66 IN | SYSTOLIC BLOOD PRESSURE: 127 MMHG

## 2020-06-25 LAB
GLUCOSE BLDC GLUCOMTR-MCNC: 181
HBA1C MFR BLD HPLC: 6

## 2020-06-25 PROCEDURE — 83036 HEMOGLOBIN GLYCOSYLATED A1C: CPT | Mod: QW

## 2020-06-25 PROCEDURE — 82962 GLUCOSE BLOOD TEST: CPT

## 2020-06-25 PROCEDURE — 99215 OFFICE O/P EST HI 40 MIN: CPT | Mod: 25

## 2020-06-26 NOTE — HISTORY OF PRESENT ILLNESS
[FreeTextEntry1] : nov 11 2018\par POC a1c 7\par on pump compliant and metformin\par thyroid nodule stable optho UTD\par \par 12/2018: Former pt of Dr. Mcgill. Here for /fu, generally feels well and endorses no acute complaints. No interval events since LV. Today reports compliance w/ meds and insulin pump therapy as instructed. Concerned for hypertriglyceridemia, which has been an ongoing issue but worsened after statin cessation while she was trying to become pregnant. \par \par 3/2019: Here for /fu, generally feels well and endorses no acute complaints. No interval events since LV. Today reports euglycemia. No hypoglycemic episodes reported, she monitors 4-6 times daily. FSG in AM wnl ~120-140. Still notes occasional post prandial hyperglycemia ~180.\par \par \par 6/20: Here for /fu, generally feels well and endorses no acute complaints. Interval ED visit for palpitations, labs showed suppresed TSH at 0.032, fT4 at 1.8, notes compliance w/ LT4 as instructed, dose has remained unchanged since 2016 at 75 mcg. TPO Ab have increased to >1000 as of 6/2020. Today pt is happy about weight loss and improved A1C. Lipid panel shows LDL < 100 and TGL <200. Other labs at goal. She tolerated metformin max dose w/o GI s.e. \par She denies plans for pregnancy at this time. Denies early heart disease in her family or personally and reports no past pancreatitis. She otherwise denies any f/c, CP, SOB, palpitations, tremors, depressed mood, anxiety, palpitations, n/v, stool/urinary abn, skin/weight changes, heat/cold intolerance, HAs, breast/nipple changes, polyuria/polydipsia/nocturia or other complaints.\par \par July 11 2018\par POC a1c 6.6\par no hypos, feels good, no pain in feet, saw optho no \par weight is stable, diet is good, does not exercise\par \par \par april 11 2018\par here for f/u no complaitns\par feels well\par no pain in feet\par no wt gain\par no hypos\par saw optho\par \par \par jan 2018\par here for f/u no complaints \par still onn the pump \par POC A1c 6.9 improved no hypos\par wants to get pregnant but not now \par eats well\par no pain in feet\par \par jan 8 2018\par 42 yo F w/ hx of Dm2 on pump, multiple miscarriages, thyroid nodules stable,  reviewed pt's pump, SBG at goal. UTD w/ retinopathy screening\par \par hgA1c 7.2 , lno hypoglycemia , pt interested in fertility this time and that is why she went on the pump in the first place and is stayiing on it. She would otherwise rather change to lantus her total for the day is about 40 units so not very insulin resistant\par She denies any hypos\par Knowledgeable about pump but reports never was able to get below 7 and would like to be there while TTC\par

## 2020-06-26 NOTE — ASSESSMENT
[Carbohydrate Consistent Diet] : carbohydrate consistent diet [FreeTextEntry1] : Basal rate now adequate, no further hypoglycemia\par BP, controlled\par LDL controlled, TGL improved on omega 3 FA. We reviewed treatment options. I advise to contine statin/tricor and continued Omega 3 FA 2 gm BID for further CVD risk reduction and TGL control.\par optho and podiatry UTD for 2019.\par TFTs at target on 5/2019. cont LT4 75 mcg fir now, proper intake reviewed. US in 6/2019 w/ stable sub cm nodules.\par rtc 3 months explained the potential high risk and toxicities with chronic insulin use including, but not limited to life threatening hypoglycemia and death, Verbalized understanding and agrees with treatment plan, will contact MD and seek emergency medical care if condition changes. Continue medtronic 670G. explained the potential high risk and toxicities with chronic insulin use including, but not limited to life threatening hypoglycemia and death, Verbalized understanding and agrees with treatment plan, will contact MD and seek emergency medical care if condition changes.\par \par hypothyroidism\par pt w/ iatrogenic hyperthyroidism at this time.likely 2/2 hashimoto thyrotoxicosis from worsened antibody titers. reviewed s/s of hypo/hyperthyroidism, reduce dose to 50 mcg, but advised that we may need to increase to weight based dose in the near future. if hyperthyroidism persists, we will hold all LT4 and refer for [facility]. perform TSI and TSH receptor Ab measurements on the NV. [Hypoglycemia Management] : hypoglycemia management [Importance of Diet and Exercise] : importance of diet and exercise to improve glycemic control, achieve weight loss and improve cardiovascular health [Long Term Vascular Complications] : long term vascular complications of diabetes [Action and use of Insulin] : action and use of short and long-acting insulin [Self Monitoring of Blood Glucose] : self monitoring of blood glucose [Levothyroxine] : The patient was instructed to take Levothyroxine on an empty stomach, separate from vitamins, and wait at least 30 minutes before eating

## 2020-07-02 ENCOUNTER — TRANSCRIPTION ENCOUNTER (OUTPATIENT)
Age: 45
End: 2020-07-02

## 2020-07-08 ENCOUNTER — TRANSCRIPTION ENCOUNTER (OUTPATIENT)
Age: 45
End: 2020-07-08

## 2020-07-16 ENCOUNTER — TRANSCRIPTION ENCOUNTER (OUTPATIENT)
Age: 45
End: 2020-07-16

## 2020-07-17 ENCOUNTER — TRANSCRIPTION ENCOUNTER (OUTPATIENT)
Age: 45
End: 2020-07-17

## 2020-07-21 ENCOUNTER — TRANSCRIPTION ENCOUNTER (OUTPATIENT)
Age: 45
End: 2020-07-21

## 2020-08-04 ENCOUNTER — APPOINTMENT (OUTPATIENT)
Dept: ENDOCRINOLOGY | Facility: CLINIC | Age: 45
End: 2020-08-04
Payer: COMMERCIAL

## 2020-08-04 VITALS
BODY MASS INDEX: 24.86 KG/M2 | SYSTOLIC BLOOD PRESSURE: 128 MMHG | DIASTOLIC BLOOD PRESSURE: 80 MMHG | HEART RATE: 80 BPM | WEIGHT: 154 LBS

## 2020-08-04 PROCEDURE — 76536 US EXAM OF HEAD AND NECK: CPT

## 2020-08-04 PROCEDURE — 99214 OFFICE O/P EST MOD 30 MIN: CPT | Mod: 25

## 2020-08-06 NOTE — PROCEDURE
[FreeTextEntry1] : POC US of the thyroid: Images stored in local hard drive\par \par Comparison: 7/2020 US\par \par Again seen is a hypoechoic heterogenous parenchyma w. a pseudomacronodular architecture. a R mid anterior nodule is stable in size and appearance at 0.9 x 0.6 x 0.9 cm. It is hyperechoic and has regular borders w/o calcifications.\par \par A heterogenous thickened isthmus is noted, however no nodules are observed.

## 2020-08-06 NOTE — ASSESSMENT
[FreeTextEntry1] : Stable US w/ features of chronic lymphocytic thyroiditis, consistent w/ clinical picture of Hashimoto Thyroiditis. no need for repeat FNA biopsy at this time. Verbalized understanding and agrees with treatment plan, will contact MD and seek emergency medical care if condition changes.\par repeat US in 8-12 months or earlier if clinically needed.

## 2020-08-11 ENCOUNTER — OUTPATIENT (OUTPATIENT)
Dept: OUTPATIENT SERVICES | Facility: HOSPITAL | Age: 45
LOS: 1 days | End: 2020-08-11

## 2020-08-11 ENCOUNTER — APPOINTMENT (OUTPATIENT)
Dept: MAMMOGRAPHY | Facility: CLINIC | Age: 45
End: 2020-08-11
Payer: COMMERCIAL

## 2020-08-11 PROCEDURE — 77067 SCR MAMMO BI INCL CAD: CPT | Mod: 26

## 2020-08-11 PROCEDURE — 77063 BREAST TOMOSYNTHESIS BI: CPT | Mod: 26

## 2020-09-25 ENCOUNTER — TRANSCRIPTION ENCOUNTER (OUTPATIENT)
Age: 45
End: 2020-09-25

## 2020-10-08 ENCOUNTER — TRANSCRIPTION ENCOUNTER (OUTPATIENT)
Age: 45
End: 2020-10-08

## 2020-10-28 ENCOUNTER — APPOINTMENT (OUTPATIENT)
Dept: ENDOCRINOLOGY | Facility: CLINIC | Age: 45
End: 2020-10-28
Payer: COMMERCIAL

## 2020-10-28 VITALS
DIASTOLIC BLOOD PRESSURE: 75 MMHG | HEART RATE: 74 BPM | BODY MASS INDEX: 25.18 KG/M2 | WEIGHT: 156 LBS | SYSTOLIC BLOOD PRESSURE: 107 MMHG

## 2020-10-28 LAB
GLUCOSE BLDC GLUCOMTR-MCNC: 211
HBA1C MFR BLD HPLC: 6.3

## 2020-10-28 PROCEDURE — 82962 GLUCOSE BLOOD TEST: CPT

## 2020-10-28 PROCEDURE — 83036 HEMOGLOBIN GLYCOSYLATED A1C: CPT | Mod: QW

## 2020-10-28 PROCEDURE — 99215 OFFICE O/P EST HI 40 MIN: CPT | Mod: 25

## 2020-10-28 PROCEDURE — 99072 ADDL SUPL MATRL&STAF TM PHE: CPT

## 2020-10-29 ENCOUNTER — TRANSCRIPTION ENCOUNTER (OUTPATIENT)
Age: 45
End: 2020-10-29

## 2020-10-29 LAB
T3 SERPL-MCNC: 99 NG/DL
T4 FREE SERPL-MCNC: 1.5 NG/DL
TSH SERPL-ACNC: 4.54 UIU/ML

## 2020-10-30 NOTE — HISTORY OF PRESENT ILLNESS
[FreeTextEntry1] : nov 11 2018\par POC a1c 7\par on pump compliant and metformin\par thyroid nodule stable optho UTD\par \par 12/2018: Former pt of Dr. Mcgill. Here for /fu, generally feels well and endorses no acute complaints. No interval events since LV. Today reports compliance w/ meds and insulin pump therapy as instructed. Concerned for hypertriglyceridemia, which has been an ongoing issue but worsened after statin cessation while she was trying to become pregnant. \par \par 3/2019: Here for /fu, generally feels well and endorses no acute complaints. No interval events since LV. Today reports euglycemia. No hypoglycemic episodes reported, she monitors 4-6 times daily. FSG in AM wnl ~120-140. Still notes occasional post prandial hyperglycemia ~180.\par \par \par 10/20: Here for /fu, generally feels well and endorses no acute complaints. mostly resolved palpitations on lower LT5 dose of 50 mcg. Today pt is happy about weight loss and improved A1C. Lipid panel shows LDL < 100 and TGL <200. Other labs at goal. She tolerated metformin max dose w/o GI s.e. \par She denies plans for pregnancy at this time. Denies early heart disease in her family or personally and reports no past pancreatitis. She otherwise denies any f/c, CP, SOB, palpitations, tremors, depressed mood, anxiety, palpitations, n/v, stool/urinary abn, skin/weight changes, heat/cold intolerance, HAs, breast/nipple changes, polyuria/polydipsia/nocturia or other complaints.\par \par July 11 2018\par POC a1c 6.6\par no hypos, feels good, no pain in feet, saw optho no DR\par weight is stable, diet is good, does not exercise\par \par \par april 11 2018\par here for f/u no complaitns\par feels well\par no pain in feet\par no wt gain\par no hypos\par saw optho\par \par \par jan 2018\par here for f/u no complaints \par still onn the pump \par POC A1c 6.9 improved no hypos\par wants to get pregnant but not now \par eats well\par no pain in feet\par \par jan 8 2018\par 42 yo F w/ hx of Dm2 on pump, multiple miscarriages, thyroid nodules stable,  reviewed pt's pump, SBG at goal. UTD w/ retinopathy screening\par \par hgA1c 7.2 , lno hypoglycemia , pt interested in fertility this time and that is why she went on the pump in the first place and is stayiing on it. She would otherwise rather change to lantus her total for the day is about 40 units so not very insulin resistant\par She denies any hypos\par Knowledgeable about pump but reports never was able to get below 7 and would like to be there while TTC\par

## 2020-10-30 NOTE — ASSESSMENT
[FreeTextEntry1] : Basal rate now adequate, no further hypoglycemia\par BP, controlled\par LDL controlled, TGL improved on omega 3 FA. We reviewed treatment options. I advise to contine statin/tricor and continued Omega 3 FA 2 gm BID for further CVD risk reduction and TGL control.\par optho and podiatry UTD for 2019.\par TFTs at target on 5/2019. cont LT4 75 mcg fir now, proper intake reviewed. US in 6/2019 w/ stable sub cm nodules.\par rtc 3 months explained the potential high risk and toxicities with chronic insulin use including, but not limited to life threatening hypoglycemia and death, Verbalized understanding and agrees with treatment plan, will contact MD and seek emergency medical care if condition changes. Continue medtronic 670G. explained the potential high risk and toxicities with chronic insulin use including, but not limited to life threatening hypoglycemia and death, Verbalized understanding and agrees with treatment plan, will contact MD and seek emergency medical care if condition changes.\par \par hypothyroidism\par pt w/ iatrogenic hyperthyroidism at this time.likely 2/2 hashimoto thyrotoxicosis from worsened antibody titers. reviewed s/s of hypo/hyperthyroidism, reduce dose to 50 mcg, but advised that we may need to increase to weight based dose in the near future. if hyperthyroidism persists, we will hold all LT4 and refer for [facility]. perform TSI and TSH receptor Ab measurements on the NV. US of the thyroid stable as of 6/2020 [Carbohydrate Consistent Diet] : carbohydrate consistent diet [Hypoglycemia Management] : hypoglycemia management [Long Term Vascular Complications] : long term vascular complications of diabetes [Importance of Diet and Exercise] : importance of diet and exercise to improve glycemic control, achieve weight loss and improve cardiovascular health [Action and use of Insulin] : action and use of short and long-acting insulin [Self Monitoring of Blood Glucose] : self monitoring of blood glucose [Levothyroxine] : The patient was instructed to take Levothyroxine on an empty stomach, separate from vitamins, and wait at least 30 minutes before eating

## 2020-11-19 ENCOUNTER — TRANSCRIPTION ENCOUNTER (OUTPATIENT)
Age: 45
End: 2020-11-19

## 2020-11-20 ENCOUNTER — TRANSCRIPTION ENCOUNTER (OUTPATIENT)
Age: 45
End: 2020-11-20

## 2020-11-25 ENCOUNTER — APPOINTMENT (OUTPATIENT)
Dept: ENDOCRINOLOGY | Facility: CLINIC | Age: 45
End: 2020-11-25
Payer: COMMERCIAL

## 2020-11-25 VITALS
SYSTOLIC BLOOD PRESSURE: 131 MMHG | BODY MASS INDEX: 24.75 KG/M2 | WEIGHT: 154 LBS | DIASTOLIC BLOOD PRESSURE: 79 MMHG | HEIGHT: 66 IN | HEART RATE: 81 BPM

## 2020-11-25 PROCEDURE — 99072 ADDL SUPL MATRL&STAF TM PHE: CPT

## 2020-11-25 PROCEDURE — 99213 OFFICE O/P EST LOW 20 MIN: CPT | Mod: 25

## 2020-11-25 PROCEDURE — 82962 GLUCOSE BLOOD TEST: CPT

## 2020-11-27 LAB — GLUCOSE BLDC GLUCOMTR-MCNC: 129

## 2020-12-01 NOTE — HISTORY OF PRESENT ILLNESS
[FreeTextEntry1] : nov 11 2018\par POC a1c 7\par on pump compliant and metformin\par thyroid nodule stable optho UTD\par \par 12/2018: Former pt of Dr. Mcgill. Here for /fu, generally feels well and endorses no acute complaints. No interval events since LV. Today reports compliance w/ meds and insulin pump therapy as instructed. Concerned for hypertriglyceridemia, which has been an ongoing issue but worsened after statin cessation while she was trying to become pregnant. \par \par 3/2019: Here for /fu, generally feels well and endorses no acute complaints. No interval events since LV. Today reports euglycemia. No hypoglycemic episodes reported, she monitors 4-6 times daily. FSG in AM wnl ~120-140. Still notes occasional post prandial hyperglycemia ~180.\par \par \par 11/20: Here for /fu, generally feels well and endorses no acute complaints. urgent visit requested after single episode of 20 seconds of diaphoresis and palpitations. notes ID was elevated at the time. denies SOB/CP. happened at rest. denies mood related trigger.\par She denies plans for pregnancy at this time. Denies early heart disease in her family or personally and reports no past pancreatitis. She otherwise denies any f/c, CP, SOB, palpitations, tremors, depressed mood, anxiety, palpitations, n/v, stool/urinary abn, skin/weight changes, heat/cold intolerance, HAs, breast/nipple changes, polyuria/polydipsia/nocturia or other complaints.\par \par July 11 2018\par POC a1c 6.6\par no hypos, feels good, no pain in feet, saw optho no DR\par weight is stable, diet is good, does not exercise\par \par \par april 11 2018\par here for f/u no complaitns\par feels well\par no pain in feet\par no wt gain\par no hypos\par saw optho\par \par \par jan 2018\par here for f/u no complaints \par still onn the pump \par POC A1c 6.9 improved no hypos\par wants to get pregnant but not now \par eats well\par no pain in feet\par \par jan 8 2018\par 42 yo F w/ hx of Dm2 on pump, multiple miscarriages, thyroid nodules stable,  reviewed pt's pump, SBG at goal. UTD w/ retinopathy screening\par \par hgA1c 7.2 , lno hypoglycemia , pt interested in fertility this time and that is why she went on the pump in the first place and is stayiing on it. She would otherwise rather change to lantus her total for the day is about 40 units so not very insulin resistant\par She denies any hypos\par Knowledgeable about pump but reports never was able to get below 7 and would like to be there while TTC\par

## 2020-12-01 NOTE — ASSESSMENT
[FreeTextEntry1] : Basal rate now adequate, no further hypoglycemia\par BP, controlled\par LDL controlled, TGL improved on omega 3 FA. We reviewed treatment options. I advise to contine statin/tricor and continued Omega 3 FA 2 gm BID for further CVD risk reduction and TGL control.\par optho and podiatry UTD for 2019.\par TFTs at target on 5/2019. cont LT4 75 mcg fir now, proper intake reviewed. US in 6/2019 w/ stable sub cm nodules.\par rtc 3 months explained the potential high risk and toxicities with chronic insulin use including, but not limited to life threatening hypoglycemia and death, Verbalized understanding and agrees with treatment plan, will contact MD and seek emergency medical care if condition changes. Continue medtronic 670G. explained the potential high risk and toxicities with chronic insulin use including, but not limited to life threatening hypoglycemia and death, Verbalized understanding and agrees with treatment plan, will contact MD and seek emergency medical care if condition changes.\par \par hypothyroidism\par pt now clinically and biochemically euthyroid. reviewed s/s of hypo/hyperthyroidism, reduced dose to 50 mcg, but advised that we may need to increase to weight based dose in the near future. if hyperthyroidism persists, we will hold all LT4 and refer for BYNUM. perform TSI and TSH receptor Ab measurements on the NV. US of the thyroid stable as of 6/2020. Advised to visit Cards and PCP ASAP to discuss new onset palpitations. reviewed alarm s/s. Verbalized understanding and agrees with treatment plan, will contact MD and seek emergency medical care if condition changes.\par  [Carbohydrate Consistent Diet] : carbohydrate consistent diet [Hypoglycemia Management] : hypoglycemia management [Long Term Vascular Complications] : long term vascular complications of diabetes [Importance of Diet and Exercise] : importance of diet and exercise to improve glycemic control, achieve weight loss and improve cardiovascular health [Action and use of Insulin] : action and use of short and long-acting insulin [Self Monitoring of Blood Glucose] : self monitoring of blood glucose [Levothyroxine] : The patient was instructed to take Levothyroxine on an empty stomach, separate from vitamins, and wait at least 30 minutes before eating

## 2020-12-02 ENCOUNTER — TRANSCRIPTION ENCOUNTER (OUTPATIENT)
Age: 45
End: 2020-12-02

## 2020-12-22 ENCOUNTER — TRANSCRIPTION ENCOUNTER (OUTPATIENT)
Age: 45
End: 2020-12-22

## 2020-12-28 ENCOUNTER — TRANSCRIPTION ENCOUNTER (OUTPATIENT)
Age: 45
End: 2020-12-28

## 2021-02-17 ENCOUNTER — TRANSCRIPTION ENCOUNTER (OUTPATIENT)
Age: 46
End: 2021-02-17

## 2021-02-18 ENCOUNTER — TRANSCRIPTION ENCOUNTER (OUTPATIENT)
Age: 46
End: 2021-02-18

## 2021-02-19 ENCOUNTER — TRANSCRIPTION ENCOUNTER (OUTPATIENT)
Age: 46
End: 2021-02-19

## 2021-02-25 ENCOUNTER — APPOINTMENT (OUTPATIENT)
Dept: ENDOCRINOLOGY | Facility: CLINIC | Age: 46
End: 2021-02-25
Payer: COMMERCIAL

## 2021-02-25 PROCEDURE — 99214 OFFICE O/P EST MOD 30 MIN: CPT | Mod: 95

## 2021-02-26 NOTE — HISTORY OF PRESENT ILLNESS
[Home] : at home, [unfilled] , at the time of the visit. [Medical Office: ([facility])___] : at the medical office located in  [Verbal consent obtained from patient] : the patient, [unfilled] [FreeTextEntry1] : 2/2021: video call for /fu, generally feels well and endorses no acute complaints. urgent visit requested after ongoing episodes  of diaphoresis and palpitations, coupled by BP elevation ~160/100. notes [place] was elevated at the time. denies SOB/CP. happened at rest. denies mood related trigger. denies hypoglycemia. ongoing Cards evaluation. compliant w/ reduced LT4 dose.\par She denies plans for pregnancy at this time. Denies early heart disease in her family or personally and reports no past pancreatitis. She otherwise denies any f/c, CP, SOB, palpitations, tremors, depressed mood, anxiety, palpitations, n/v, stool/urinary abn, skin/weight changes, heat/cold intolerance, HAs, breast/nipple changes, polyuria/polydipsia/nocturia or other complaints.\par \par Past history:\par nov 11 2018\par POC a1c 7\par on pump compliant and metformin\par thyroid nodule stable optho UTD\par \par 12/2018: Former pt of Dr. Mcgill. Here for /fu, generally feels well and endorses no acute complaints. No interval events since LV. Today reports compliance w/ meds and insulin pump therapy as instructed. Concerned for hypertriglyceridemia, which has been an ongoing issue but worsened after statin cessation while she was trying to become pregnant. \par \par 3/2019: Here for /fu, generally feels well and endorses no acute complaints. No interval events since LV. Today reports euglycemia. No hypoglycemic episodes reported, she monitors 4-6 times daily. FSG in AM wnl ~120-140. Still notes occasional post prandial hyperglycemia ~180.\par \par \par \par \par July 11 2018\par POC a1c 6.6\par no hypos, feels good, no pain in feet, saw optho no DR\par weight is stable, diet is good, does not exercise\par \par \par april 11 2018\par here for f/u no complaitns\par feels well\par no pain in feet\par no wt gain\par no hypos\par saw optho\par \par \par jan 2018\par here for f/u no complaints \par still onn the pump \par POC A1c 6.9 improved no hypos\par wants to get pregnant but not now \par eats well\par no pain in feet\par \par jan 8 2018\par 40 yo F w/ hx of Dm2 on pump, multiple miscarriages, thyroid nodules stable,  reviewed pt's pump, SBG at goal. UTD w/ retinopathy screening\par \par hgA1c 7.2 , lno hypoglycemia , pt interested in fertility this time and that is why she went on the pump in the first place and is stayiing on it. She would otherwise rather change to lantus her total for the day is about 40 units so not very insulin resistant\par She denies any hypos\par Knowledgeable about pump but reports never was able to get below 7 and would like to be there while TTC\par

## 2021-02-26 NOTE — ASSESSMENT
[FreeTextEntry1] : intermittent BP elevation, no s/s of recurrent hyperthyroid state, although she is due for f/u. given BP elevation w. episodic nature, we will r/o pheochromocytoma w/ 24 hour urine metanephrine evaluation.\par \par \par Basal rate now adequate, no further hypoglycemia BP, controlled\par LDL controlled, TGL improved on omega 3 FA. We reviewed treatment options. I advise to contine statin/tricor and continued Omega 3 FA 2 gm BID for further CVD risk reduction and TGL control.\par optho and podiatry UTD for 2019.\par TFTs at target on 5/2019. cont LT4 75 mcg fir now, proper intake reviewed. US in 6/2019 w/ stable sub cm nodules.\par rtc 3 months explained the potential high risk and toxicities with chronic insulin use including, but not limited to life threatening hypoglycemia and death, Verbalized understanding and agrees with treatment plan, will contact MD and seek emergency medical care if condition changes. Continue medtronic 670G. explained the potential high risk and toxicities with chronic insulin use including, but not limited to life threatening hypoglycemia and death, Verbalized understanding and agrees with treatment plan, will contact MD and seek emergency medical care if condition changes.\par \par hypothyroidism\par pt now clinically and biochemically euthyroid. reviewed s/s of hypo/hyperthyroidism, reduced dose to 50 mcg, but advised that we may need to increase to weight based dose in the near future. if hyperthyroidism persists, we will hold all LT4 and refer for BYNUM. perform TSI and TSH receptor Ab measurements on the NV. US of the thyroid stable as of 6/2020. Advised to visit Cards and PCP ASAP to discuss new onset palpitations. reviewed alarm s/s. Verbalized understanding and agrees with treatment plan, will contact MD and seek emergency medical care if condition changes.\par  [Carbohydrate Consistent Diet] : carbohydrate consistent diet [Hypoglycemia Management] : hypoglycemia management [Long Term Vascular Complications] : long term vascular complications of diabetes [Importance of Diet and Exercise] : importance of diet and exercise to improve glycemic control, achieve weight loss and improve cardiovascular health [Action and use of Insulin] : action and use of short and long-acting insulin [Self Monitoring of Blood Glucose] : self monitoring of blood glucose [Levothyroxine] : The patient was instructed to take Levothyroxine on an empty stomach, separate from vitamins, and wait at least 30 minutes before eating

## 2021-03-02 ENCOUNTER — TRANSCRIPTION ENCOUNTER (OUTPATIENT)
Age: 46
End: 2021-03-02

## 2021-03-09 ENCOUNTER — TRANSCRIPTION ENCOUNTER (OUTPATIENT)
Age: 46
End: 2021-03-09

## 2021-03-23 ENCOUNTER — TRANSCRIPTION ENCOUNTER (OUTPATIENT)
Age: 46
End: 2021-03-23

## 2021-03-24 ENCOUNTER — TRANSCRIPTION ENCOUNTER (OUTPATIENT)
Age: 46
End: 2021-03-24

## 2021-03-30 ENCOUNTER — TRANSCRIPTION ENCOUNTER (OUTPATIENT)
Age: 46
End: 2021-03-30

## 2021-03-31 ENCOUNTER — TRANSCRIPTION ENCOUNTER (OUTPATIENT)
Age: 46
End: 2021-03-31

## 2021-04-02 ENCOUNTER — TRANSCRIPTION ENCOUNTER (OUTPATIENT)
Age: 46
End: 2021-04-02

## 2021-04-21 ENCOUNTER — TRANSCRIPTION ENCOUNTER (OUTPATIENT)
Age: 46
End: 2021-04-21

## 2021-04-21 ENCOUNTER — RX RENEWAL (OUTPATIENT)
Age: 46
End: 2021-04-21

## 2021-04-22 ENCOUNTER — TRANSCRIPTION ENCOUNTER (OUTPATIENT)
Age: 46
End: 2021-04-22

## 2021-07-01 ENCOUNTER — APPOINTMENT (OUTPATIENT)
Dept: ENDOCRINOLOGY | Facility: CLINIC | Age: 46
End: 2021-07-01
Payer: COMMERCIAL

## 2021-07-01 VITALS
WEIGHT: 153 LBS | SYSTOLIC BLOOD PRESSURE: 125 MMHG | BODY MASS INDEX: 24.7 KG/M2 | DIASTOLIC BLOOD PRESSURE: 86 MMHG | HEART RATE: 79 BPM

## 2021-07-01 LAB
GLUCOSE BLDC GLUCOMTR-MCNC: 85
HBA1C MFR BLD HPLC: 6.5

## 2021-07-01 PROCEDURE — 82962 GLUCOSE BLOOD TEST: CPT

## 2021-07-01 PROCEDURE — 36415 COLL VENOUS BLD VENIPUNCTURE: CPT

## 2021-07-01 PROCEDURE — 99215 OFFICE O/P EST HI 40 MIN: CPT | Mod: 25

## 2021-07-01 PROCEDURE — 83036 HEMOGLOBIN GLYCOSYLATED A1C: CPT | Mod: QW

## 2021-07-01 PROCEDURE — 99072 ADDL SUPL MATRL&STAF TM PHE: CPT

## 2021-07-02 NOTE — ASSESSMENT
[FreeTextEntry1] : intermittent BP elevation, no s/s of recurrent hyperthyroid state, although she is due for f/u. given BP elevation w. ruled out pheochromocytoma w/ 24 hour urine metanephrine evaluation.\par \par \par Basal rate now adequate, no further hypoglycemia BP, controlled\par LDL controlled, TGL improved on omega 3 FA. We reviewed treatment options. I advise to contine statin/tricor and continued Omega 3 FA 2 gm BID for further CVD risk reduction and TGL control.\par optho and podiatry UTD for 2020.\par TFTs at target on 5/2019. cont LT4 50 mcg fir now, proper intake reviewed. US in 6/2020 w/ stable sub cm nodules. repeat on NV.\par rtc 3 months explained the potential high risk and toxicities with chronic insulin use including, but not limited to life threatening hypoglycemia and death, Verbalized understanding and agrees with treatment plan, will contact MD and seek emergency medical care if condition changes. Continue medtronic 670G. explained the potential high risk and toxicities with chronic insulin use including, but not limited to life threatening hypoglycemia and death, Verbalized understanding and agrees with treatment plan, will contact MD and seek emergency medical care if condition changes.\par \par hypothyroidism\par pt now clinically and biochemically euthyroid. reviewed s/s of hypo/hyperthyroidism, reduced dose to 50 mcg, but advised that we may need to increase to weight based dose in the near future. S of the thyroid stable as of 6/2020. Advised to visit Cards and PCP ASAP to discuss new onset palpitations. reviewed alarm s/s. Verbalized understanding and agrees with treatment plan, will contact MD and seek emergency medical care if condition changes.\par  [Carbohydrate Consistent Diet] : carbohydrate consistent diet [Hypoglycemia Management] : hypoglycemia management [Long Term Vascular Complications] : long term vascular complications of diabetes [Importance of Diet and Exercise] : importance of diet and exercise to improve glycemic control, achieve weight loss and improve cardiovascular health [Action and use of Insulin] : action and use of short and long-acting insulin [Self Monitoring of Blood Glucose] : self monitoring of blood glucose [Levothyroxine] : The patient was instructed to take Levothyroxine on an empty stomach, separate from vitamins, and wait at least 30 minutes before eating

## 2021-07-08 LAB
25(OH)D3 SERPL-MCNC: 44 NG/ML
ALBUMIN SERPL ELPH-MCNC: 5.1 G/DL
ALP BLD-CCNC: 79 U/L
ALT SERPL-CCNC: 25 U/L
ANION GAP SERPL CALC-SCNC: 14 MMOL/L
AST SERPL-CCNC: 21 U/L
BILIRUB SERPL-MCNC: 0.3 MG/DL
BUN SERPL-MCNC: 17 MG/DL
CALCIUM SERPL-MCNC: 10.3 MG/DL
CHLORIDE SERPL-SCNC: 98 MMOL/L
CHOLEST SERPL-MCNC: 186 MG/DL
CO2 SERPL-SCNC: 26 MMOL/L
CREAT SERPL-MCNC: 0.69 MG/DL
CREAT SPEC-SCNC: 84 MG/DL
FOLATE SERPL-MCNC: 13.2 NG/ML
GLUCOSE SERPL-MCNC: 95 MG/DL
HDLC SERPL-MCNC: 37 MG/DL
LDLC SERPL CALC-MCNC: 90 MG/DL
MICROALBUMIN 24H UR DL<=1MG/L-MCNC: 3 MG/DL
MICROALBUMIN/CREAT 24H UR-RTO: 35 MG/G
NONHDLC SERPL-MCNC: 150 MG/DL
POTASSIUM SERPL-SCNC: 4.4 MMOL/L
PROT SERPL-MCNC: 8 G/DL
SODIUM SERPL-SCNC: 138 MMOL/L
T3 SERPL-MCNC: 88 NG/DL
T4 FREE SERPL-MCNC: 1.6 NG/DL
TRIGL SERPL-MCNC: 300 MG/DL
TSH SERPL-ACNC: 3.42 UIU/ML
VIT B12 SERPL-MCNC: 592 PG/ML

## 2021-08-10 ENCOUNTER — TRANSCRIPTION ENCOUNTER (OUTPATIENT)
Age: 46
End: 2021-08-10

## 2021-08-26 ENCOUNTER — APPOINTMENT (OUTPATIENT)
Dept: ENDOCRINOLOGY | Facility: CLINIC | Age: 46
End: 2021-08-26
Payer: COMMERCIAL

## 2021-08-26 PROCEDURE — 99443: CPT

## 2021-08-30 NOTE — HISTORY OF PRESENT ILLNESS
[Home] : at home, [unfilled] , at the time of the visit. [Medical Office: ([facility])___] : at the medical office located in  [Verbal consent obtained from patient] : the patient, [unfilled] [FreeTextEntry1] : 8/2021: phone requested for review of ED visits, states she has had multiple episodes of elevated BP, a/b palpitations and anxiety. reports FSG usually rise to ~200 but denies hypoglycemia. remains adherent to LT4 as instructed, TSH was obtained during one of these ED visits and found to be elevated at 5.8 per pt. She otherwise denies any f/c, CP, SOB, tremors, depressed mood, n/v, stool/urinary abn, skin/weight changes, heat/cold intolerance, HAs, breast/nipple changes, polyuria/polydipsia/nocturia or other complaints.\par \par Past history:\par nov 11 2018\par POC a1c 7\par on pump compliant and metformin\par thyroid nodule stable optho UTD\par \par 12/2018: Former pt of Dr. Mcgill. Here for /fu, generally feels well and endorses no acute complaints. No interval events since LV. Today reports compliance w/ meds and insulin pump therapy as instructed. Concerned for hypertriglyceridemia, which has been an ongoing issue but worsened after statin cessation while she was trying to become pregnant. \par \par 3/2019: Here for /fu, generally feels well and endorses no acute complaints. No interval events since LV. Today reports euglycemia. No hypoglycemic episodes reported, she monitors 4-6 times daily. FSG in AM wnl ~120-140. Still notes occasional post prandial hyperglycemia ~180.\par \par \par \par \par July 11 2018\par POC a1c 6.6\par no hypos, feels good, no pain in feet, saw optho no DR\par weight is stable, diet is good, does not exercise\par \par \par april 11 2018\par here for f/u no complaitns\par feels well\par no pain in feet\par no wt gain\par no hypos\par saw optho\par \par \par jan 2018\par here for f/u no complaints \par still onn the pump \par POC A1c 6.9 improved no hypos\par wants to get pregnant but not now \par eats well\par no pain in feet\par \par jan 8 2018\par 42 yo F w/ hx of Dm2 on pump, multiple miscarriages, thyroid nodules stable,  reviewed pt's pump, SBG at goal. UTD w/ retinopathy screening\par \par hgA1c 7.2 , lno hypoglycemia , pt interested in fertility this time and that is why she went on the pump in the first place and is stayiing on it. She would otherwise rather change to lantus her total for the day is about 40 units so not very insulin resistant\par She denies any hypos\par Knowledgeable about pump but reports never was able to get below 7 and would like to be there while TTC\par

## 2021-08-30 NOTE — ASSESSMENT
[FreeTextEntry1] : intermittent BP elevation w/ ED visits, no s/s of recurrent hyperthyroid state as self reported TSH was elevated at 5.7,given BP elevation we will once again r/o hormonal etiologies, which we have ruled out in 2019 and 2020. check for carcinoid syndrome, pheo and 1ry aldosteronism.\par \par \par \par hypothyroidism\par pt now clinically and biochemically euthyroid. reviewed s/s of hypo/hyperthyroidism, reduced dose to 50 mcg, but advised that we may need to increase to weight based dose in the near future. S of the thyroid stable as of 6/2020. Advised to visit Cards and PCP ASAP to discuss new onset palpitations. reviewed alarm s/s. Verbalized understanding and agrees with treatment plan, will contact MD and seek emergency medical care if condition changes.\par  [Carbohydrate Consistent Diet] : carbohydrate consistent diet [Hypoglycemia Management] : hypoglycemia management [Long Term Vascular Complications] : long term vascular complications of diabetes [Importance of Diet and Exercise] : importance of diet and exercise to improve glycemic control, achieve weight loss and improve cardiovascular health [Action and use of Insulin] : action and use of short and long-acting insulin [Self Monitoring of Blood Glucose] : self monitoring of blood glucose [Levothyroxine] : The patient was instructed to take Levothyroxine on an empty stomach, separate from vitamins, and wait at least 30 minutes before eating

## 2021-09-02 ENCOUNTER — TRANSCRIPTION ENCOUNTER (OUTPATIENT)
Age: 46
End: 2021-09-02

## 2021-09-15 ENCOUNTER — APPOINTMENT (OUTPATIENT)
Dept: ULTRASOUND IMAGING | Facility: CLINIC | Age: 46
End: 2021-09-15
Payer: COMMERCIAL

## 2021-09-15 ENCOUNTER — OUTPATIENT (OUTPATIENT)
Dept: OUTPATIENT SERVICES | Facility: HOSPITAL | Age: 46
LOS: 1 days | End: 2021-09-15

## 2021-09-15 ENCOUNTER — APPOINTMENT (OUTPATIENT)
Dept: MAMMOGRAPHY | Facility: CLINIC | Age: 46
End: 2021-09-15
Payer: COMMERCIAL

## 2021-09-15 PROCEDURE — 77067 SCR MAMMO BI INCL CAD: CPT | Mod: 26

## 2021-09-15 PROCEDURE — 77063 BREAST TOMOSYNTHESIS BI: CPT | Mod: 26

## 2021-09-15 PROCEDURE — 76641 ULTRASOUND BREAST COMPLETE: CPT | Mod: 26,50

## 2021-09-17 ENCOUNTER — TRANSCRIPTION ENCOUNTER (OUTPATIENT)
Age: 46
End: 2021-09-17

## 2021-09-22 ENCOUNTER — TRANSCRIPTION ENCOUNTER (OUTPATIENT)
Age: 46
End: 2021-09-22

## 2021-09-22 ENCOUNTER — APPOINTMENT (OUTPATIENT)
Dept: NEPHROLOGY | Facility: CLINIC | Age: 46
End: 2021-09-22
Payer: COMMERCIAL

## 2021-09-22 VITALS
DIASTOLIC BLOOD PRESSURE: 84 MMHG | OXYGEN SATURATION: 99 % | WEIGHT: 143 LBS | BODY MASS INDEX: 23.08 KG/M2 | HEART RATE: 68 BPM | TEMPERATURE: 98.1 F | SYSTOLIC BLOOD PRESSURE: 126 MMHG

## 2021-09-22 PROCEDURE — 99205 OFFICE O/P NEW HI 60 MIN: CPT

## 2021-09-22 NOTE — HISTORY OF PRESENT ILLNESS
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 Patient reports 10lb weight loss however decreased appetite, eating about 50% less than what she usually does. \par \par \par Spikes in BP - with throbbing in chest, some ?HA throbbing, sweat. Usually takes an additional 40mg of propranolol but takes a few hours to come back down. BP spikes around 180/110 then goes down gradually. \par \par Has been on low salt diet, cooking at home, has not noticed change in BP at home. \par \par Nephrologic History:\par Stones: None\par NSAID use: Tylenol only\par HTN: Since last year july propranolol\par DM: Diagnosed at 26, started on insulin in 2013\par Prior nephrologist: None\par Kidney biopsy: No\par Reduced kidney mass (prematurity): Not premature, ?low birthweight\par Pre-eclampsia: Early miscarriage x 2, no pre-eclampsia\par Urination history: 4-5x per day, when has high pressure urinates more\par 24 hour metanephrine negative\par \par Medications: Propranolol 80 mg PO QHS, DM on insulin pump, Levothyroxine, fenofibrate, atorvastatin, gabapentin 300 mg PO QHS, zoloft started 3 weeks ago, Vitamin D, vitamin C, vitamin B12, Coq10, fish oil, accupuncturist takes chinese herb (Rehmannia glutinosa; unclear combination) as needed ambien. \par Used to be on lisinopril, had facial swelling\par \par Social history: part time; No smokin, No etoh, no IVDU. \par Family history: No siblings or children, Mom with HTN and sweating started at similar age, but never figured out what caused it. Father BEATRIZ, pacemaker, lymphoma in remission

## 2021-09-22 NOTE — REVIEW OF SYSTEMS
[As Noted in HPI] : as noted in HPI [Recent Weight Loss (___ Lbs)] : recent [unfilled] ~Ulb weight loss [Negative] : Heme/Lymph [FreeTextEntry2] : sweating

## 2021-09-22 NOTE — CONSULT LETTER
[Dear  ___] : Dear  [unfilled], [Consult Letter:] : I had the pleasure of evaluating your patient, [unfilled]. [Please see my note below.] : Please see my note below. [Consult Closing:] : Thank you very much for allowing me to participate in the care of this patient.  If you have any questions, please do not hesitate to contact me. [FreeTextEntry1] : I believe her HTN is likely genetic in origin given her family history, and is not likely related to the sweating she has (? early menopause, has a family history of the same symptoms around the same time in mother). I will rule out CHIN just in case, but likely she needs adjustment of her medications to control these spikes [FreeTextEntry3] : Warm regards,\par lEeazar Driver MD MA

## 2021-09-22 NOTE — PHYSICAL EXAM
[General Appearance - Alert] : alert [General Appearance - In No Acute Distress] : in no acute distress [Sclera] : the sclera and conjunctiva were normal [PERRL With Normal Accommodation] : pupils were equal in size, round, and reactive to light [Extraocular Movements] : extraocular movements were intact [Outer Ear] : the ears and nose were normal in appearance [Oropharynx] : the oropharynx was normal [Neck Appearance] : the appearance of the neck was normal [Neck Cervical Mass (___cm)] : no neck mass was observed [Jugular Venous Distention Increased] : there was no jugular-venous distention [Respiration, Rhythm And Depth] : normal respiratory rhythm and effort [Exaggerated Use Of Accessory Muscles For Inspiration] : no accessory muscle use [Auscultation Breath Sounds / Voice Sounds] : lungs were clear to auscultation bilaterally [Heart Rate And Rhythm] : heart rate was normal and rhythm regular [Heart Sounds] : normal S1 and S2 [Heart Sounds Gallop] : no gallops [Murmurs] : no murmurs [Heart Sounds Pericardial Friction Rub] : no pericardial rub [Arterial Pulses Femoral] : femoral pulses were normal without bruits [Edema] : there was no peripheral edema [Veins - Varicosity Changes] : there were no varicosital changes [FreeTextEntry1] : No abdominal bruit [Bowel Sounds] : normal bowel sounds [Abdomen Soft] : soft [Abdomen Tenderness] : non-tender [Abdomen Mass (___ Cm)] : no abdominal mass palpated [No CVA Tenderness] : no ~M costovertebral angle tenderness [No Spinal Tenderness] : no spinal tenderness [Skin Color & Pigmentation] : normal skin color and pigmentation [Skin Turgor] : normal skin turgor [] : no rash [Motor Exam] : the motor exam was normal [No Focal Deficits] : no focal deficits [Oriented To Time, Place, And Person] : oriented to person, place, and time [Impaired Insight] : insight and judgment were intact [Affect] : the affect was normal

## 2021-09-22 NOTE — ASSESSMENT
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 \par - Given mother with similar symptoms and early menopause at same age, patient without period for months it is possible that her sweating and hot flash type symptoms or menopausal\par - More than likely will need BID dosing of medications vs different agent in AM to control BP, if negative CHIN screen will start Nifedipine ER 30mg and titrate up to 60 as needed to control spikes in BP\par - 24 hour BP monitor in near future after screen\par Discussed how to take BP appropriately at home. Advised not to smoke, drink caffeinated beverages or exercise within 30 minutes before measuring BP. Make sure cuff fits arm, as small cuffs can artificially raise BP.\par Make sure bladder is empty. With the cuff on your bare arm, sit in an upright position with back supported, feet flat on the floor and arm supported at heart level. Make sure the bottom of the cuff is directly above the bend of the elbow. Relax for about five minutes before taking a measurement. Resist the urge to talk or look at a cellphone. Wait one minute and retake BP, consider 3rd if elevated. Average the three readings and record in log book, bring to each check up. \par Bring device at next visit to ensure accuracy \par \par \par Anxiety - improving with medication and doctors visits\par \par Sweating - given negative screens for HTN pheo and neuroendocrine tumor more likely hot flash\par \par RTC in 1 month with device, will call with results of scan\par

## 2021-09-23 ENCOUNTER — TRANSCRIPTION ENCOUNTER (OUTPATIENT)
Age: 46
End: 2021-09-23

## 2021-09-29 ENCOUNTER — RESULT REVIEW (OUTPATIENT)
Age: 46
End: 2021-09-29

## 2021-09-29 ENCOUNTER — APPOINTMENT (OUTPATIENT)
Dept: ULTRASOUND IMAGING | Facility: CLINIC | Age: 46
End: 2021-09-29
Payer: COMMERCIAL

## 2021-09-29 ENCOUNTER — OUTPATIENT (OUTPATIENT)
Dept: OUTPATIENT SERVICES | Facility: HOSPITAL | Age: 46
LOS: 1 days | End: 2021-09-29

## 2021-09-29 PROCEDURE — 76770 US EXAM ABDO BACK WALL COMP: CPT | Mod: 26,59

## 2021-09-29 PROCEDURE — 93975 VASCULAR STUDY: CPT | Mod: 26

## 2021-10-19 ENCOUNTER — RX RENEWAL (OUTPATIENT)
Age: 46
End: 2021-10-19

## 2021-10-19 ENCOUNTER — TRANSCRIPTION ENCOUNTER (OUTPATIENT)
Age: 46
End: 2021-10-19

## 2021-10-20 ENCOUNTER — APPOINTMENT (OUTPATIENT)
Dept: NEPHROLOGY | Facility: CLINIC | Age: 46
End: 2021-10-20

## 2021-10-27 ENCOUNTER — APPOINTMENT (OUTPATIENT)
Dept: NEPHROLOGY | Facility: CLINIC | Age: 46
End: 2021-10-27
Payer: COMMERCIAL

## 2021-10-27 VITALS
BODY MASS INDEX: 23.3 KG/M2 | HEIGHT: 66 IN | TEMPERATURE: 97.5 F | WEIGHT: 145 LBS | OXYGEN SATURATION: 98 % | SYSTOLIC BLOOD PRESSURE: 111 MMHG | HEART RATE: 66 BPM | DIASTOLIC BLOOD PRESSURE: 75 MMHG

## 2021-10-27 PROCEDURE — 99215 OFFICE O/P EST HI 40 MIN: CPT

## 2021-10-27 NOTE — ASSESSMENT
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

## 2021-10-27 NOTE — HISTORY OF PRESENT ILLNESS
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 Highest 134/87 which came down minutes later to 107/73\par \par Technique reviewed - Omron device; 118/83, Technique excellent, matches our machine\par Based on this would not increase any BP medications, continue propranolol 80 at night for now. No symptoms of low BP\par \par Two herbs in current concoction may be related to lower BPs and high micturation rate- Ligusticum, Rhizoma may have diuretic affect, cautioned to discuss with homeopath

## 2021-11-17 ENCOUNTER — APPOINTMENT (OUTPATIENT)
Dept: ENDOCRINOLOGY | Facility: CLINIC | Age: 46
End: 2021-11-17
Payer: COMMERCIAL

## 2021-11-17 PROCEDURE — 99215 OFFICE O/P EST HI 40 MIN: CPT

## 2021-11-18 NOTE — HISTORY OF PRESENT ILLNESS
[FreeTextEntry1] : 11/2021: f/u requested for new medtronic 670G pump set up. remains adherent to LT4 as instructed, She otherwise denies any f/c, CP, SOB, tremors, depressed mood, n/v, stool/urinary abn, skin/weight changes, heat/cold intolerance, HAs, breast/nipple changes, polyuria/polydipsia/nocturia or other complaints.\par \par Past history:\par nov 11 2018\par POC a1c 7\par on pump compliant and metformin\par thyroid nodule stable optho UTD\par \par 12/2018: Former pt of Dr. Mcgill. Here for /fu, generally feels well and endorses no acute complaints. No interval events since LV. Today reports compliance w/ meds and insulin pump therapy as instructed. Concerned for hypertriglyceridemia, which has been an ongoing issue but worsened after statin cessation while she was trying to become pregnant. \par \par 3/2019: Here for /fu, generally feels well and endorses no acute complaints. No interval events since LV. Today reports euglycemia. No hypoglycemic episodes reported, she monitors 4-6 times daily. FSG in AM wnl ~120-140. Still notes occasional post prandial hyperglycemia ~180.\par \par \par \par \par July 11 2018\par POC a1c 6.6\par no hypos, feels good, no pain in feet, saw optho no DR\par weight is stable, diet is good, does not exercise\par \par \par april 11 2018\par here for f/u no complaitns\par feels well\par no pain in feet\par no wt gain\par no hypos\par saw optho\par \par \par jan 2018\par here for f/u no complaints \par still onn the pump \par POC A1c 6.9 improved no hypos\par wants to get pregnant but not now \par eats well\par no pain in feet\par \par jan 8 2018\par 42 yo F w/ hx of Dm2 on pump, multiple miscarriages, thyroid nodules stable,  reviewed pt's pump, SBG at goal. UTD w/ retinopathy screening\par \par hgA1c 7.2 , lno hypoglycemia , pt interested in fertility this time and that is why she went on the pump in the first place and is stayiing on it. She would otherwise rather change to lantus her total for the day is about 40 units so not very insulin resistant\par She denies any hypos\par Knowledgeable about pump but reports never was able to get below 7 and would like to be there while TTC\par

## 2021-11-18 NOTE — ASSESSMENT
[FreeTextEntry1] : 1) DM2: controlled, A1C on  target of <7%. Natural hx of the disease and importance of treatment targets discussed at length, she verbalized understanding. ADA diet and importance of exercise discussed at length. Plan is to cont metformin full dose. pump settings as follows: Basal rate at 26 units/daily. ISF 1:50. I to C at 1/12 UTD w/ vaccines and podiatry/opthalmology referrals on NV. hypoglycemia protocol reviewed. declines CGM w/ augmented pump use at this time\par \par \par intermittent BP elevation w/ ED visits, no s/s of recurrent hyperthyroid state as self reported TSH was elevated at 5.7,given BP elevation we will once again r/o hormonal etiologies, which we have ruled out in 2019 and 2020. check for carcinoid syndrome, pheo and 1ry aldosteronism.\par \par \par \par hypothyroidism\par pt now clinically and biochemically euthyroid. reviewed s/s of hypo/hyperthyroidism, reduced dose to 50 mcg, but advised that we may need to increase to weight based dose in the near future. S of the thyroid stable as of 6/2020. Advised to visit Cards and PCP ASAP to discuss new onset palpitations. reviewed alarm s/s. Verbalized understanding and agrees with treatment plan, will contact MD and seek emergency medical care if condition changes.\par  [Carbohydrate Consistent Diet] : carbohydrate consistent diet [Hypoglycemia Management] : hypoglycemia management [Long Term Vascular Complications] : long term vascular complications of diabetes [Importance of Diet and Exercise] : importance of diet and exercise to improve glycemic control, achieve weight loss and improve cardiovascular health [Action and use of Insulin] : action and use of short and long-acting insulin [Self Monitoring of Blood Glucose] : self monitoring of blood glucose [Levothyroxine] : The patient was instructed to take Levothyroxine on an empty stomach, separate from vitamins, and wait at least 30 minutes before eating

## 2021-11-23 ENCOUNTER — TRANSCRIPTION ENCOUNTER (OUTPATIENT)
Age: 46
End: 2021-11-23

## 2021-11-23 RX ORDER — GABAPENTIN 300 MG/1
300 CAPSULE ORAL
Qty: 90 | Refills: 3 | Status: ACTIVE | COMMUNITY
Start: 1900-01-01 | End: 1900-01-01

## 2021-12-15 ENCOUNTER — TRANSCRIPTION ENCOUNTER (OUTPATIENT)
Age: 46
End: 2021-12-15

## 2022-01-04 ENCOUNTER — TRANSCRIPTION ENCOUNTER (OUTPATIENT)
Age: 47
End: 2022-01-04

## 2022-01-18 ENCOUNTER — TRANSCRIPTION ENCOUNTER (OUTPATIENT)
Age: 47
End: 2022-01-18

## 2022-03-04 ENCOUNTER — NON-APPOINTMENT (OUTPATIENT)
Age: 47
End: 2022-03-04

## 2022-03-04 ENCOUNTER — APPOINTMENT (OUTPATIENT)
Dept: NEPHROLOGY | Facility: CLINIC | Age: 47
End: 2022-03-04
Payer: COMMERCIAL

## 2022-03-04 VITALS
HEART RATE: 60 BPM | OXYGEN SATURATION: 100 % | RESPIRATION RATE: 12 BRPM | SYSTOLIC BLOOD PRESSURE: 112 MMHG | DIASTOLIC BLOOD PRESSURE: 70 MMHG

## 2022-03-04 PROCEDURE — 99215 OFFICE O/P EST HI 40 MIN: CPT

## 2022-03-05 NOTE — ASSESSMENT
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 \par

## 2022-03-05 NOTE — PHYSICAL EXAM
[General Appearance - Alert] : alert [General Appearance - In No Acute Distress] : in no acute distress [Sclera] : the sclera and conjunctiva were normal [PERRL With Normal Accommodation] : pupils were equal in size, round, and reactive to light [Extraocular Movements] : extraocular movements were intact [Oropharynx] : the oropharynx was normal [Outer Ear] : the ears and nose were normal in appearance [Neck Appearance] : the appearance of the neck was normal [Neck Cervical Mass (___cm)] : no neck mass was observed [Jugular Venous Distention Increased] : there was no jugular-venous distention [Respiration, Rhythm And Depth] : normal respiratory rhythm and effort [Exaggerated Use Of Accessory Muscles For Inspiration] : no accessory muscle use [Auscultation Breath Sounds / Voice Sounds] : lungs were clear to auscultation bilaterally [Heart Rate And Rhythm] : heart rate was normal and rhythm regular [Heart Sounds] : normal S1 and S2 [Heart Sounds Gallop] : no gallops [Murmurs] : no murmurs [Heart Sounds Pericardial Friction Rub] : no pericardial rub [Arterial Pulses Femoral] : femoral pulses were normal without bruits [Veins - Varicosity Changes] : there were no varicosital changes [Edema] : there was no peripheral edema [FreeTextEntry1] : No abdominal bruit [Bowel Sounds] : normal bowel sounds [Abdomen Soft] : soft [Abdomen Tenderness] : non-tender [Abdomen Mass (___ Cm)] : no abdominal mass palpated [No CVA Tenderness] : no ~M costovertebral angle tenderness [No Spinal Tenderness] : no spinal tenderness [Skin Color & Pigmentation] : normal skin color and pigmentation [Skin Turgor] : normal skin turgor [] : no rash [Motor Exam] : the motor exam was normal [No Focal Deficits] : no focal deficits [Oriented To Time, Place, And Person] : oriented to person, place, and time [Impaired Insight] : insight and judgment were intact [Affect] : the affect was normal

## 2022-03-05 NOTE — HISTORY OF PRESENT ILLNESS
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 No end organ damage yet. Will watch. \par Dysautonomia test done march 1st with [facility], waiting for result\par \par Homeopath unconcerned with possible diuretic effect, only taking herbs sparingly. \par \par BPs have been stable overall, well controlled in office her today. Patient stressed about war in [place] and friends there, but dealing well and has psychologist who she speaks to which helps.

## 2022-03-24 ENCOUNTER — NON-APPOINTMENT (OUTPATIENT)
Age: 47
End: 2022-03-24

## 2022-03-24 ENCOUNTER — TRANSCRIPTION ENCOUNTER (OUTPATIENT)
Age: 47
End: 2022-03-24

## 2022-03-25 ENCOUNTER — TRANSCRIPTION ENCOUNTER (OUTPATIENT)
Age: 47
End: 2022-03-25

## 2022-03-28 ENCOUNTER — TRANSCRIPTION ENCOUNTER (OUTPATIENT)
Age: 47
End: 2022-03-28

## 2022-06-30 ENCOUNTER — APPOINTMENT (OUTPATIENT)
Dept: ENDOCRINOLOGY | Facility: CLINIC | Age: 47
End: 2022-06-30

## 2022-06-30 VITALS
HEIGHT: 66 IN | BODY MASS INDEX: 24.91 KG/M2 | WEIGHT: 155 LBS | DIASTOLIC BLOOD PRESSURE: 75 MMHG | HEART RATE: 65 BPM | SYSTOLIC BLOOD PRESSURE: 108 MMHG

## 2022-06-30 LAB
GLUCOSE BLDC GLUCOMTR-MCNC: 124
HBA1C MFR BLD HPLC: 6.3

## 2022-06-30 PROCEDURE — 82962 GLUCOSE BLOOD TEST: CPT

## 2022-06-30 PROCEDURE — 83036 HEMOGLOBIN GLYCOSYLATED A1C: CPT | Mod: QW

## 2022-06-30 PROCEDURE — 99215 OFFICE O/P EST HI 40 MIN: CPT | Mod: 25

## 2022-06-30 NOTE — ASSESSMENT
[FreeTextEntry1] : 1) DM2: controlled, A1C on  target of <7%. Natural hx of the disease and importance of treatment targets discussed at length, she verbalized understanding. ADA diet and importance of exercise discussed at length. Plan is to cont metformin 500 mg TID. pump settings as follows: Basal rate at 26.5 units/daily. ISF 1:50. I to C at 1/15 UTD w/ vaccines and podiatry/opthalmology referrals on [place]. hypoglycemia protocol reviewed. declines CGM w/ augmented pump use at this time\par \par \par intermittent BP elevation w/ ED visits, no s/s of recurrent hyperthyroid state , TSH at 2.9 on 6/2022,BP control stable at this time.\par \par \par \par hypothyroidism\par pt now clinically and biochemically euthyroid. reviewed s/s of hypo/hyperthyroidism, reduced dose to 50 mcg, but advised that we may need to increase to weight based dose in the near future. US of the thyroid stable as of 6/2020. US for surveillance order provided [Carbohydrate Consistent Diet] : carbohydrate consistent diet [Hypoglycemia Management] : hypoglycemia management [Long Term Vascular Complications] : long term vascular complications of diabetes [Importance of Diet and Exercise] : importance of diet and exercise to improve glycemic control, achieve weight loss and improve cardiovascular health [Action and use of Insulin] : action and use of short and long-acting insulin [Self Monitoring of Blood Glucose] : self monitoring of blood glucose [Levothyroxine] : The patient was instructed to take Levothyroxine on an empty stomach, separate from vitamins, and wait at least 30 minutes before eating

## 2022-06-30 NOTE — HISTORY OF PRESENT ILLNESS
[FreeTextEntry1] : 6/2023: Here for f/u. no interval hosp or ED visits. incidental MGUS found during Neuro w/u for Dysautonomy. using medtronic 670G pump effectively, no hypoglycemiec events or DKA. has backup insulin pens. remains adherent to LT4 as instructed, She otherwise denies any f/c, CP, SOB, tremors, depressed mood, n/v, stool/urinary abn, skin/weight changes, heat/cold intolerance, HAs, breast/nipple changes, polyuria/polydipsia/nocturia or other complaints. she again denies any dysphagia, hoarseness, neck tenderness or new palpable masses. she again denies any family history of thyroid disorders or personal exposure to ionizing radiation.\par \par \par Past history:\par nov 11 2018\par POC a1c 7\par on pump compliant and metformin\par thyroid nodule stable optho UTD\par \par 12/2018: Former pt of Dr. Mcgill. Here for /fu, generally feels well and endorses no acute complaints. No interval events since LV. Today reports compliance w/ meds and insulin pump therapy as instructed. Concerned for hypertriglyceridemia, which has been an ongoing issue but worsened after statin cessation while she was trying to become pregnant. \par \par 3/2019: Here for /fu, generally feels well and endorses no acute complaints. No interval events since LV. Today reports euglycemia. No hypoglycemic episodes reported, she monitors 4-6 times daily. FSG in AM wnl ~120-140. Still notes occasional post prandial hyperglycemia ~180.\par \par \par \par \par July 11 2018\par POC a1c 6.6\par no hypos, feels good, no pain in feet, saw optho no DR\par weight is stable, diet is good, does not exercise\par \par \par april 11 2018\par here for f/u no complaitns\par feels well\par no pain in feet\par no wt gain\par no hypos\par saw optho\par \par \par jan 2018\par here for f/u no complaints \par still onn the pump \par POC A1c 6.9 improved no hypos\par wants to get pregnant but not now \par eats well\par no pain in feet\par \par jan 8 2018\par 42 yo F w/ hx of Dm2 on pump, multiple miscarriages, thyroid nodules stable,  reviewed pt's pump, SBG at goal. UTD w/ retinopathy screening\par \par hgA1c 7.2 , lno hypoglycemia , pt interested in fertility this time and that is why she went on the pump in the first place and is stayiing on it. She would otherwise rather change to lantus her total for the day is about 40 units so not very insulin resistant\par She denies any hypos\par Knowledgeable about pump but reports never was able to get below 7 and would like to be there while TTC\par

## 2022-09-09 ENCOUNTER — APPOINTMENT (OUTPATIENT)
Dept: NEPHROLOGY | Facility: CLINIC | Age: 47
End: 2022-09-09

## 2022-09-09 PROCEDURE — 99215 OFFICE O/P EST HI 40 MIN: CPT

## 2022-09-12 VITALS — DIASTOLIC BLOOD PRESSURE: 72 MMHG | RESPIRATION RATE: 12 BRPM | HEART RATE: 66 BPM | SYSTOLIC BLOOD PRESSURE: 107 MMHG

## 2022-09-12 NOTE — ASSESSMENT
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; very low stage planning on using cream no chemo or surgery) HTN presenting for follow up\par \par HTN - overcontrolled, however discussion with cardiologist was to continue propranolol and given no symptoms of hypotension or other issue, would continue for now, if other medication changes desired can discuss\par - Continue infrequent BP checks\par - Call if BPs elevated consistently\par \par Anxiety - speaking with psych regularly, some anxiety but doing well overall\par \par Sweating - likely hot flash, continues not responding to alternative medicine, will likely not continue on\par \par RTC in 6 months.

## 2022-09-12 NOTE — HISTORY OF PRESENT ILLNESS
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 no particular issues.

## 2022-10-20 ENCOUNTER — APPOINTMENT (OUTPATIENT)
Dept: ENDOCRINOLOGY | Facility: CLINIC | Age: 47
End: 2022-10-20

## 2022-10-20 VITALS
HEART RATE: 61 BPM | BODY MASS INDEX: 24.37 KG/M2 | SYSTOLIC BLOOD PRESSURE: 94 MMHG | WEIGHT: 151 LBS | DIASTOLIC BLOOD PRESSURE: 61 MMHG

## 2022-10-20 LAB
GLUCOSE BLDC GLUCOMTR-MCNC: 119
HBA1C MFR BLD HPLC: 6.1

## 2022-10-20 PROCEDURE — 83036 HEMOGLOBIN GLYCOSYLATED A1C: CPT | Mod: QW

## 2022-10-20 PROCEDURE — 82962 GLUCOSE BLOOD TEST: CPT

## 2022-10-20 PROCEDURE — 99215 OFFICE O/P EST HI 40 MIN: CPT | Mod: 25

## 2022-10-20 PROCEDURE — 36415 COLL VENOUS BLD VENIPUNCTURE: CPT

## 2022-10-25 ENCOUNTER — TRANSCRIPTION ENCOUNTER (OUTPATIENT)
Age: 47
End: 2022-10-25

## 2022-10-25 LAB
ALBUMIN SERPL ELPH-MCNC: 4.9 G/DL
ALP BLD-CCNC: 94 U/L
ALT SERPL-CCNC: 32 U/L
ANION GAP SERPL CALC-SCNC: 11 MMOL/L
AST SERPL-CCNC: 25 U/L
BILIRUB SERPL-MCNC: 0.3 MG/DL
BUN SERPL-MCNC: 14 MG/DL
CALCIUM SERPL-MCNC: 10.2 MG/DL
CHLORIDE SERPL-SCNC: 100 MMOL/L
CHOLEST SERPL-MCNC: 188 MG/DL
CO2 SERPL-SCNC: 28 MMOL/L
CREAT SERPL-MCNC: 0.61 MG/DL
CREAT SPEC-SCNC: 108 MG/DL
EGFR: 112 ML/MIN/1.73M2
FOLATE SERPL-MCNC: 11.4 NG/ML
GLUCOSE SERPL-MCNC: 117 MG/DL
HDLC SERPL-MCNC: 37 MG/DL
LDLC SERPL CALC-MCNC: 107 MG/DL
MICROALBUMIN 24H UR DL<=1MG/L-MCNC: <1.2 MG/DL
MICROALBUMIN/CREAT 24H UR-RTO: NORMAL MG/G
NONHDLC SERPL-MCNC: 151 MG/DL
POTASSIUM SERPL-SCNC: 4.9 MMOL/L
PROT SERPL-MCNC: 7.8 G/DL
SODIUM SERPL-SCNC: 140 MMOL/L
T3 SERPL-MCNC: 85 NG/DL
T4 FREE SERPL-MCNC: 1.6 NG/DL
TRIGL SERPL-MCNC: 220 MG/DL
TSH SERPL-ACNC: 3.44 UIU/ML
VIT B12 SERPL-MCNC: 807 PG/ML

## 2022-10-25 NOTE — ASSESSMENT
[FreeTextEntry1] : 1) DM2: controlled, A1C on  target of <7%. Natural hx of the disease and importance of treatment targets discussed at length, she verbalized understanding. ADA diet and importance of exercise discussed at length. Plan is to cont metformin 500 mg TID. pump settings as follows: Basal rate at 26.5 units/daily. ISF 1:50. I to C at 1/15 UTD w/ vaccines and podiatry/opthalmology referrals on [place]. hypoglycemia protocol reviewed. declines CGM w/ augmented pump use at this time\par \par \par intermittent BP elevation w/ ED visits, no s/s of recurrent hyperthyroid state , TSH at 2.9 on 6/2022,BP control stable at this time.\par \par \par \par hypothyroidism\par pt now clinically and biochemically euthyroid. reviewed s/s of hypo/hyperthyroidism, reduced dose to 50 mcg, but advised that we may need to increase to weight based dose in the near future. US of the thyroid stable as of 7/2022. US for surveillance in 1 year [Carbohydrate Consistent Diet] : carbohydrate consistent diet [Hypoglycemia Management] : hypoglycemia management [Long Term Vascular Complications] : long term vascular complications of diabetes [Importance of Diet and Exercise] : importance of diet and exercise to improve glycemic control, achieve weight loss and improve cardiovascular health [Action and use of Insulin] : action and use of short and long-acting insulin [Self Monitoring of Blood Glucose] : self monitoring of blood glucose [Levothyroxine] : The patient was instructed to take Levothyroxine on an empty stomach, separate from vitamins, and wait at least 30 minutes before eating

## 2022-10-25 NOTE — HISTORY OF PRESENT ILLNESS
[FreeTextEntry1] : 10/2022: Here for f/u. no interval hosp or ED visits. incidental MGUS found during Neuro w/u for Dysautonomy. using medtronic 670G pump effectively, no hypoglycemiec events or DKA. has backup insulin pens. remains adherent to LT4 as instructed, 7/2022 US of the thyroid w/ stable nodule. She otherwise denies any f/c, CP, SOB, tremors, depressed mood, n/v, stool/urinary abn, skin/weight changes, heat/cold intolerance, HAs, breast/nipple changes, polyuria/polydipsia/nocturia or other complaints. she again denies any dysphagia, hoarseness, neck tenderness or new palpable masses. she again denies any family history of thyroid disorders or personal exposure to ionizing radiation.\par \par \par Past history:\par nov 11 2018\par POC a1c 7\par on pump compliant and metformin\par thyroid nodule stable optho UTD\par \par 12/2018: Former pt of Dr. Mcgill. Here for /fu, generally feels well and endorses no acute complaints. No interval events since LV. Today reports compliance w/ meds and insulin pump therapy as instructed. Concerned for hypertriglyceridemia, which has been an ongoing issue but worsened after statin cessation while she was trying to become pregnant. \par \par 3/2019: Here for /fu, generally feels well and endorses no acute complaints. No interval events since LV. Today reports euglycemia. No hypoglycemic episodes reported, she monitors 4-6 times daily. FSG in AM wnl ~120-140. Still notes occasional post prandial hyperglycemia ~180.\par \par \par \par \par July 11 2018\par POC a1c 6.6\par no hypos, feels good, no pain in feet, saw optho no DR\par weight is stable, diet is good, does not exercise\par \par \par april 11 2018\par here for f/u no complaitns\par feels well\par no pain in feet\par no wt gain\par no hypos\par saw optho\par \par \par jan 2018\par here for f/u no complaints \par still onn the pump \par POC A1c 6.9 improved no hypos\par wants to get pregnant but not now \par eats well\par no pain in feet\par \par jan 8 2018\par 40 yo F w/ hx of Dm2 on pump, multiple miscarriages, thyroid nodules stable,  reviewed pt's pump, SBG at goal. UTD w/ retinopathy screening\par \par hgA1c 7.2 , lno hypoglycemia , pt interested in fertility this time and that is why she went on the pump in the first place and is stayiing on it. She would otherwise rather change to lantus her total for the day is about 40 units so not very insulin resistant\par She denies any hypos\par Knowledgeable about pump but reports never was able to get below 7 and would like to be there while TTC\par

## 2022-11-12 ENCOUNTER — APPOINTMENT (OUTPATIENT)
Dept: MAMMOGRAPHY | Facility: CLINIC | Age: 47
End: 2022-11-12

## 2022-11-12 ENCOUNTER — APPOINTMENT (OUTPATIENT)
Dept: ULTRASOUND IMAGING | Facility: CLINIC | Age: 47
End: 2022-11-12

## 2022-11-12 ENCOUNTER — OUTPATIENT (OUTPATIENT)
Dept: OUTPATIENT SERVICES | Facility: HOSPITAL | Age: 47
LOS: 1 days | End: 2022-11-12

## 2022-11-12 PROCEDURE — 76641 ULTRASOUND BREAST COMPLETE: CPT | Mod: 26,50

## 2022-11-12 PROCEDURE — 77063 BREAST TOMOSYNTHESIS BI: CPT | Mod: 26

## 2022-11-12 PROCEDURE — 77067 SCR MAMMO BI INCL CAD: CPT | Mod: 26

## 2022-12-20 ENCOUNTER — TRANSCRIPTION ENCOUNTER (OUTPATIENT)
Age: 47
End: 2022-12-20

## 2023-01-05 ENCOUNTER — TRANSCRIPTION ENCOUNTER (OUTPATIENT)
Age: 48
End: 2023-01-05

## 2023-01-19 ENCOUNTER — TRANSCRIPTION ENCOUNTER (OUTPATIENT)
Age: 48
End: 2023-01-19

## 2023-02-10 ENCOUNTER — TRANSCRIPTION ENCOUNTER (OUTPATIENT)
Age: 48
End: 2023-02-10

## 2023-02-10 RX ORDER — GABAPENTIN 300 MG/1
300 CAPSULE ORAL
Qty: 30 | Refills: 6 | Status: ACTIVE | COMMUNITY
Start: 2018-07-11 | End: 1900-01-01

## 2023-02-10 RX ORDER — LANCETS 33 GAUGE
EACH MISCELLANEOUS
Qty: 800 | Refills: 1 | Status: ACTIVE | COMMUNITY
Start: 2018-11-01 | End: 1900-01-01

## 2023-03-14 DIAGNOSIS — E55.9 VITAMIN D DEFICIENCY, UNSPECIFIED: ICD-10-CM

## 2023-03-16 ENCOUNTER — APPOINTMENT (OUTPATIENT)
Dept: NEPHROLOGY | Facility: CLINIC | Age: 48
End: 2023-03-16
Payer: COMMERCIAL

## 2023-03-16 DIAGNOSIS — R23.2 FLUSHING: ICD-10-CM

## 2023-03-16 DIAGNOSIS — F32.A DEPRESSION, UNSPECIFIED: ICD-10-CM

## 2023-03-16 PROCEDURE — 99215 OFFICE O/P EST HI 40 MIN: CPT

## 2023-03-20 VITALS — DIASTOLIC BLOOD PRESSURE: 62 MMHG | HEART RATE: 62 BPM | RESPIRATION RATE: 12 BRPM | SYSTOLIC BLOOD PRESSURE: 100 MMHG

## 2023-03-20 PROBLEM — R23.2 HOT FLASHES: Status: ACTIVE | Noted: 2021-08-26

## 2023-03-20 NOTE — HISTORY OF PRESENT ILLNESS
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

## 2023-03-20 NOTE — ASSESSMENT
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; very low stage planning on using cream no chemo or surgery) HTN presenting for follow up\par \par HTN - overcontrolled, however discussion with cardiologist was to continue propranolol and given no symptoms of hypotension or other issue, would continue for now, if other medication changes desired can discuss\par - Continue infrequent BP checks\par - Call if BPs elevated consistently\par \par Anxiety - speaking with psych regularly, some anxiety but doing well overall\par \par Sweating - Improved on femarin, continue given no HTN with it despite licorice and black cohash\par \par RTC in 6-12 months, call if infrequent BP checks elevated

## 2023-04-20 ENCOUNTER — APPOINTMENT (OUTPATIENT)
Dept: ENDOCRINOLOGY | Facility: CLINIC | Age: 48
End: 2023-04-20
Payer: COMMERCIAL

## 2023-04-20 VITALS
DIASTOLIC BLOOD PRESSURE: 65 MMHG | BODY MASS INDEX: 24.86 KG/M2 | WEIGHT: 154 LBS | SYSTOLIC BLOOD PRESSURE: 96 MMHG | HEART RATE: 60 BPM

## 2023-04-20 LAB — GLUCOSE BLDC GLUCOMTR-MCNC: 198

## 2023-04-20 PROCEDURE — 99215 OFFICE O/P EST HI 40 MIN: CPT | Mod: 25

## 2023-04-20 PROCEDURE — 82962 GLUCOSE BLOOD TEST: CPT

## 2023-04-28 NOTE — HISTORY OF PRESENT ILLNESS
[FreeTextEntry1] : 4/2023: Here for f/u. no interval hosp or ED visits. incidental MGUS found during Neuro w/u for Dysautonomy. using medtronic 670G pump effectively, no hypoglycemiec events or DKA. has backup insulin pens. remains adherent to LT4 as instructed, 7/2022 US of the thyroid w/ stable nodule. She otherwise denies any f/c, CP, SOB, tremors, depressed mood, n/v, stool/urinary abn, skin/weight changes, heat/cold intolerance, HAs, breast/nipple changes, polyuria/polydipsia/nocturia or other complaints. she again denies any dysphagia, hoarseness, neck tenderness or new palpable masses. she again denies any family history of thyroid disorders or personal exposure to ionizing radiation.\par \par \par Past history:\par nov 11 2018\par POC a1c 7\par on pump compliant and metformin\par thyroid nodule stable optho UTD\par \par 12/2018: Former pt of Dr. Mcgill. Here for /fu, generally feels well and endorses no acute complaints. No interval events since LV. Today reports compliance w/ meds and insulin pump therapy as instructed. Concerned for hypertriglyceridemia, which has been an ongoing issue but worsened after statin cessation while she was trying to become pregnant. \par \par 3/2019: Here for /fu, generally feels well and endorses no acute complaints. No interval events since LV. Today reports euglycemia. No hypoglycemic episodes reported, she monitors 4-6 times daily. FSG in AM wnl ~120-140. Still notes occasional post prandial hyperglycemia ~180.\par \par \par \par \par July 11 2018\par POC a1c 6.6\par no hypos, feels good, no pain in feet, saw optho no DR\par weight is stable, diet is good, does not exercise\par \par \par april 11 2018\par here for f/u no complaitns\par feels well\par no pain in feet\par no wt gain\par no hypos\par saw optho\par \par \par jan 2018\par here for f/u no complaints \par still onn the pump \par POC A1c 6.9 improved no hypos\par wants to get pregnant but not now \par eats well\par no pain in feet\par \par jan 8 2018\par 40 yo F w/ hx of Dm2 on pump, multiple miscarriages, thyroid nodules stable,  reviewed pt's pump, SBG at goal. UTD w/ retinopathy screening\par \par hgA1c 7.2 , lno hypoglycemia , pt interested in fertility this time and that is why she went on the pump in the first place and is stayiing on it. She would otherwise rather change to lantus her total for the day is about 40 units so not very insulin resistant\par She denies any hypos\par Knowledgeable about pump but reports never was able to get below 7 and would like to be there while TTC\par

## 2023-05-10 ENCOUNTER — TRANSCRIPTION ENCOUNTER (OUTPATIENT)
Age: 48
End: 2023-05-10

## 2023-07-20 ENCOUNTER — APPOINTMENT (OUTPATIENT)
Dept: ENDOCRINOLOGY | Facility: CLINIC | Age: 48
End: 2023-07-20
Payer: COMMERCIAL

## 2023-07-20 VITALS
SYSTOLIC BLOOD PRESSURE: 117 MMHG | BODY MASS INDEX: 24.59 KG/M2 | HEIGHT: 66 IN | DIASTOLIC BLOOD PRESSURE: 78 MMHG | WEIGHT: 153 LBS | HEART RATE: 69 BPM

## 2023-07-20 PROCEDURE — 83036 HEMOGLOBIN GLYCOSYLATED A1C: CPT | Mod: QW

## 2023-07-20 PROCEDURE — 99215 OFFICE O/P EST HI 40 MIN: CPT | Mod: 25

## 2023-07-20 PROCEDURE — 82962 GLUCOSE BLOOD TEST: CPT

## 2023-07-21 LAB
GLUCOSE BLDC GLUCOMTR-MCNC: 153
HBA1C MFR BLD HPLC: 6.5

## 2023-07-25 ENCOUNTER — TRANSCRIPTION ENCOUNTER (OUTPATIENT)
Age: 48
End: 2023-07-25

## 2023-07-26 NOTE — HISTORY OF PRESENT ILLNESS
VTE Assessment already completed for this visit [FreeTextEntry1] : 7/2021: here for /fu, generally feels well and endorses no acute complaints. notes ongoing anxiety issues w/o SIHI. denies hypoglycemia. ongoing Cards evaluation. compliant w/ reduced LT4 dose.\par She denies plans for pregnancy at this time. Denies early heart disease in her family or personally and reports no past pancreatitis. She otherwise denies any f/c, CP, SOB, palpitations, tremors, depressed mood, anxiety, palpitations, n/v, stool/urinary abn, skin/weight changes, heat/cold intolerance, HAs, breast/nipple changes, polyuria/polydipsia/nocturia or other complaints.\par \par Past history:\par nov 11 2018\par POC a1c 7\par on pump compliant and metformin\par thyroid nodule stable optho UTD\par \par 12/2018: Former pt of Dr. Mcgill. Here for /fu, generally feels well and endorses no acute complaints. No interval events since LV. Today reports compliance w/ meds and insulin pump therapy as instructed. Concerned for hypertriglyceridemia, which has been an ongoing issue but worsened after statin cessation while she was trying to become pregnant. \par \par 3/2019: Here for /fu, generally feels well and endorses no acute complaints. No interval events since LV. Today reports euglycemia. No hypoglycemic episodes reported, she monitors 4-6 times daily. FSG in AM wnl ~120-140. Still notes occasional post prandial hyperglycemia ~180.\par \par \par \par \par July 11 2018\par POC a1c 6.6\par no hypos, feels good, no pain in feet, saw optho no DR\par weight is stable, diet is good, does not exercise\par \par \par april 11 2018\par here for f/u no complaitns\par feels well\par no pain in feet\par no wt gain\par no hypos\par saw optho\par \par \par jan 2018\par here for f/u no complaints \par still onn the pump \par POC A1c 6.9 improved no hypos\par wants to get pregnant but not now \par eats well\par no pain in feet\par \par jan 8 2018\par 42 yo F w/ hx of Dm2 on pump, multiple miscarriages, thyroid nodules stable,  reviewed pt's pump, SBG at goal. UTD w/ retinopathy screening\par \par hgA1c 7.2 , lno hypoglycemia , pt interested in fertility this time and that is why she went on the pump in the first place and is stayiing on it. She would otherwise rather change to lantus her total for the day is about 40 units so not very insulin resistant\par She denies any hypos\par Knowledgeable about pump but reports never was able to get below 7 and would like to be there while TTC\par

## 2023-07-26 NOTE — HISTORY OF PRESENT ILLNESS
[FreeTextEntry1] : 7/2023: Here for f/u. no interval hosp or ED visits. incidental MGUS found during Neuro w/u for Dysautonomy. using medtronic 670G pump effectively, no hypoglycemiec events or DKA. has backup insulin pens. remains adherent to LT4 as instructed, 7/2022 US of the thyroid w/ stable nodule. She otherwise denies any f/c, CP, SOB, tremors, depressed mood, n/v, stool/urinary abn, skin/weight changes, heat/cold intolerance, HAs, breast/nipple changes, polyuria/polydipsia/nocturia or other complaints. she again denies any dysphagia, hoarseness, neck tenderness or new palpable masses. she again denies any family history of thyroid disorders or personal exposure to ionizing radiation.\par \par \par Past history:\par nov 11 2018\par POC a1c 7\par on pump compliant and metformin\par thyroid nodule stable optho UTD\par \par 12/2018: Former pt of Dr. Mcgill. Here for /fu, generally feels well and endorses no acute complaints. No interval events since LV. Today reports compliance w/ meds and insulin pump therapy as instructed. Concerned for hypertriglyceridemia, which has been an ongoing issue but worsened after statin cessation while she was trying to become pregnant. \par \par 3/2019: Here for /fu, generally feels well and endorses no acute complaints. No interval events since LV. Today reports euglycemia. No hypoglycemic episodes reported, she monitors 4-6 times daily. FSG in AM wnl ~120-140. Still notes occasional post prandial hyperglycemia ~180.\par \par \par \par \par July 11 2018\par POC a1c 6.6\par no hypos, feels good, no pain in feet, saw optho no DR\par weight is stable, diet is good, does not exercise\par \par \par april 11 2018\par here for f/u no complaitns\par feels well\par no pain in feet\par no wt gain\par no hypos\par saw optho\par \par \par jan 2018\par here for f/u no complaints \par still onn the pump \par POC A1c 6.9 improved no hypos\par wants to get pregnant but not now \par eats well\par no pain in feet\par \par jan 8 2018\par 42 yo F w/ hx of Dm2 on pump, multiple miscarriages, thyroid nodules stable,  reviewed pt's pump, SBG at goal. UTD w/ retinopathy screening\par \par hgA1c 7.2 , lno hypoglycemia , pt interested in fertility this time and that is why she went on the pump in the first place and is stayiing on it. She would otherwise rather change to lantus her total for the day is about 40 units so not very insulin resistant\par She denies any hypos\par Knowledgeable about pump but reports never was able to get below 7 and would like to be there while TTC\par

## 2023-08-01 ENCOUNTER — APPOINTMENT (OUTPATIENT)
Dept: ULTRASOUND IMAGING | Facility: CLINIC | Age: 48
End: 2023-08-01

## 2023-08-01 ENCOUNTER — OUTPATIENT (OUTPATIENT)
Dept: OUTPATIENT SERVICES | Facility: HOSPITAL | Age: 48
LOS: 1 days | End: 2023-08-01
Payer: COMMERCIAL

## 2023-08-01 PROCEDURE — 76536 US EXAM OF HEAD AND NECK: CPT | Mod: 26

## 2023-09-13 ENCOUNTER — TRANSCRIPTION ENCOUNTER (OUTPATIENT)
Age: 48
End: 2023-09-13

## 2023-09-14 ENCOUNTER — TRANSCRIPTION ENCOUNTER (OUTPATIENT)
Age: 48
End: 2023-09-14

## 2023-11-22 ENCOUNTER — APPOINTMENT (OUTPATIENT)
Dept: ENDOCRINOLOGY | Facility: CLINIC | Age: 48
End: 2023-11-22
Payer: COMMERCIAL

## 2023-11-22 VITALS
HEART RATE: 65 BPM | DIASTOLIC BLOOD PRESSURE: 61 MMHG | HEIGHT: 66 IN | SYSTOLIC BLOOD PRESSURE: 104 MMHG | WEIGHT: 155 LBS | BODY MASS INDEX: 24.91 KG/M2

## 2023-11-22 LAB
GLUCOSE BLDC GLUCOMTR-MCNC: 127
HBA1C MFR BLD HPLC: 6.9

## 2023-11-22 PROCEDURE — 83036 HEMOGLOBIN GLYCOSYLATED A1C: CPT | Mod: QW

## 2023-11-22 PROCEDURE — 82962 GLUCOSE BLOOD TEST: CPT

## 2023-11-22 PROCEDURE — 99214 OFFICE O/P EST MOD 30 MIN: CPT | Mod: 25

## 2023-11-22 RX ORDER — LANCETS
EACH MISCELLANEOUS
Qty: 300 | Refills: 3 | Status: ACTIVE | COMMUNITY
Start: 2023-11-22 | End: 1900-01-01

## 2023-11-22 RX ORDER — BLOOD SUGAR DIAGNOSTIC
STRIP MISCELLANEOUS
Qty: 160 | Refills: 11 | Status: COMPLETED | COMMUNITY
Start: 2018-01-10 | End: 2023-11-22

## 2023-11-22 RX ORDER — METFORMIN ER 500 MG 500 MG/1
500 TABLET ORAL
Qty: 360 | Refills: 2 | Status: ACTIVE | COMMUNITY
Start: 2019-03-12 | End: 1900-01-01

## 2023-11-24 ENCOUNTER — APPOINTMENT (OUTPATIENT)
Dept: ULTRASOUND IMAGING | Facility: CLINIC | Age: 48
End: 2023-11-24
Payer: COMMERCIAL

## 2023-11-24 ENCOUNTER — OUTPATIENT (OUTPATIENT)
Dept: OUTPATIENT SERVICES | Facility: HOSPITAL | Age: 48
LOS: 1 days | End: 2023-11-24

## 2023-11-24 ENCOUNTER — APPOINTMENT (OUTPATIENT)
Dept: MAMMOGRAPHY | Facility: CLINIC | Age: 48
End: 2023-11-24
Payer: COMMERCIAL

## 2023-11-24 PROCEDURE — 76641 ULTRASOUND BREAST COMPLETE: CPT | Mod: 26,50

## 2023-11-24 PROCEDURE — 77063 BREAST TOMOSYNTHESIS BI: CPT | Mod: 26

## 2023-11-24 PROCEDURE — 77067 SCR MAMMO BI INCL CAD: CPT | Mod: 26

## 2023-12-01 LAB
ALBUMIN SERPL ELPH-MCNC: 4.7 G/DL
ALP BLD-CCNC: 81 U/L
ALT SERPL-CCNC: 35 U/L
ANION GAP SERPL CALC-SCNC: 12 MMOL/L
AST SERPL-CCNC: 26 U/L
BILIRUB SERPL-MCNC: 0.3 MG/DL
BUN SERPL-MCNC: 16 MG/DL
CALCIUM SERPL-MCNC: 10.1 MG/DL
CHLORIDE SERPL-SCNC: 102 MMOL/L
CHOLEST SERPL-MCNC: 179 MG/DL
CO2 SERPL-SCNC: 27 MMOL/L
CREAT SERPL-MCNC: 0.66 MG/DL
CREAT SPEC-SCNC: 58 MG/DL
EGFR: 109 ML/MIN/1.73M2
FOLATE SERPL-MCNC: 10.6 NG/ML
GLUCOSE SERPL-MCNC: 110 MG/DL
HDLC SERPL-MCNC: 40 MG/DL
LDLC SERPL CALC-MCNC: 102 MG/DL
MICROALBUMIN 24H UR DL<=1MG/L-MCNC: <1.2 MG/DL
MICROALBUMIN/CREAT 24H UR-RTO: NORMAL MG/G
NONHDLC SERPL-MCNC: 139 MG/DL
POTASSIUM SERPL-SCNC: 4.4 MMOL/L
PROT SERPL-MCNC: 7.6 G/DL
SODIUM SERPL-SCNC: 141 MMOL/L
T4 FREE SERPL-MCNC: 1.5 NG/DL
TRIGL SERPL-MCNC: 209 MG/DL
TSH SERPL-ACNC: 3.52 UIU/ML
VIT B12 SERPL-MCNC: 670 PG/ML

## 2024-01-05 ENCOUNTER — RX RENEWAL (OUTPATIENT)
Age: 49
End: 2024-01-05

## 2024-01-18 ENCOUNTER — APPOINTMENT (OUTPATIENT)
Dept: NEPHROLOGY | Facility: CLINIC | Age: 49
End: 2024-01-18
Payer: COMMERCIAL

## 2024-01-18 VITALS — SYSTOLIC BLOOD PRESSURE: 122 MMHG | DIASTOLIC BLOOD PRESSURE: 70 MMHG

## 2024-01-18 DIAGNOSIS — F41.9 ANXIETY DISORDER, UNSPECIFIED: ICD-10-CM

## 2024-01-18 PROCEDURE — G2211 COMPLEX E/M VISIT ADD ON: CPT

## 2024-01-18 PROCEDURE — 99215 OFFICE O/P EST HI 40 MIN: CPT

## 2024-01-21 VITALS — HEART RATE: 66 BPM | RESPIRATION RATE: 12 BRPM

## 2024-01-21 PROBLEM — F41.9 ANXIETY: Status: ACTIVE | Noted: 2021-09-22

## 2024-01-21 NOTE — HISTORY OF PRESENT ILLNESS
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 Denies complains, recent labs stable.  Pt's weight is 152 lb today.  BP controlled.

## 2024-01-21 NOTE — PHYSICAL EXAM
[General Appearance - Alert] : alert [General Appearance - In No Acute Distress] : in no acute distress [Sclera] : the sclera and conjunctiva were normal [PERRL With Normal Accommodation] : pupils were equal in size, round, and reactive to light [Extraocular Movements] : extraocular movements were intact [Auscultation Breath Sounds / Voice Sounds] : lungs were clear to auscultation bilaterally [Heart Rate And Rhythm] : heart rate was normal and rhythm regular [Heart Sounds] : normal S1 and S2 [Heart Sounds Gallop] : no gallops [Murmurs] : no murmurs [Heart Sounds Pericardial Friction Rub] : no pericardial rub [Bowel Sounds] : normal bowel sounds [Abdomen Soft] : soft [Abdomen Tenderness] : non-tender [Abdomen Mass (___ Cm)] : no abdominal mass palpated [No CVA Tenderness] : no ~M costovertebral angle tenderness [No Spinal Tenderness] : no spinal tenderness [Deep Tendon Reflexes (DTR)] : deep tendon reflexes were 2+ and symmetric [Sensation] : the sensory exam was normal to light touch and pinprick [No Focal Deficits] : no focal deficits [Outer Ear] : the ears and nose were normal in appearance [Oropharynx] : the oropharynx was normal [Neck Appearance] : the appearance of the neck was normal [Neck Cervical Mass (___cm)] : no neck mass was observed [Jugular Venous Distention Increased] : there was no jugular-venous distention [Arterial Pulses Femoral] : femoral pulses were normal without bruits [Edema] : there was no peripheral edema [Veins - Varicosity Changes] : there were no varicosital changes [FreeTextEntry1] : No abdominal bruit [Abnormal Walk] : normal gait [Involuntary Movements] : no involuntary movements were seen [Skin Color & Pigmentation] : normal skin color and pigmentation [Skin Turgor] : normal skin turgor [] : no rash [Oriented To Time, Place, And Person] : oriented to person, place, and time [Impaired Insight] : insight and judgment were intact [Affect] : the affect was normal

## 2024-01-21 NOTE — ASSESSMENT
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 monitoring. Continue infrequent BP checks Anxiety: Doing well overall Sweating: Improved on Femarin, BP wnl.   Planning on traveling to [facility], anxiety controlled, BP at goal, denies complains, recent blood work wnl (from pcp office)  RTC in 6-12 months

## 2024-02-27 ENCOUNTER — RX RENEWAL (OUTPATIENT)
Age: 49
End: 2024-02-27

## 2024-02-29 ENCOUNTER — TRANSCRIPTION ENCOUNTER (OUTPATIENT)
Age: 49
End: 2024-02-29

## 2024-03-03 ENCOUNTER — TRANSCRIPTION ENCOUNTER (OUTPATIENT)
Age: 49
End: 2024-03-03

## 2024-03-19 ENCOUNTER — APPOINTMENT (OUTPATIENT)
Dept: ENDOCRINOLOGY | Facility: CLINIC | Age: 49
End: 2024-03-19
Payer: COMMERCIAL

## 2024-03-19 VITALS
DIASTOLIC BLOOD PRESSURE: 72 MMHG | SYSTOLIC BLOOD PRESSURE: 114 MMHG | HEART RATE: 61 BPM | WEIGHT: 152 LBS | BODY MASS INDEX: 24.53 KG/M2

## 2024-03-19 DIAGNOSIS — Z01.419 ENCOUNTER FOR GYNECOLOGICAL EXAMINATION (GENERAL) (ROUTINE) W/OUT ABNORMAL FINDINGS: ICD-10-CM

## 2024-03-19 DIAGNOSIS — E78.5 HYPERLIPIDEMIA, UNSPECIFIED: ICD-10-CM

## 2024-03-19 PROCEDURE — 99215 OFFICE O/P EST HI 40 MIN: CPT | Mod: 25

## 2024-03-19 PROCEDURE — 82962 GLUCOSE BLOOD TEST: CPT

## 2024-03-19 PROCEDURE — 83036 HEMOGLOBIN GLYCOSYLATED A1C: CPT | Mod: QW

## 2024-03-19 RX ORDER — ESTRADIOL 0.1 MG/G
0.1 CREAM VAGINAL
Qty: 2 | Refills: 0 | Status: ACTIVE | COMMUNITY
Start: 2024-03-19 | End: 1900-01-01

## 2024-03-20 LAB
GLUCOSE BLDC GLUCOMTR-MCNC: 113
HBA1C MFR BLD HPLC: 7.3

## 2024-03-22 NOTE — HISTORY OF PRESENT ILLNESS
[FreeTextEntry1] : 3/2024: Here for f/u. no interval hosp. using medtronic 670G pump effectively, no hypoglycemiec events or DKA. has backup insulin pens. remains adherent to LT4 as instructed, inquires about addition of SGLT-2 inh vs. GLP-1 agonist for additional CV protection after discussion w/ Cards. She otherwise denies any f/c, CP, SOB, tremors, depressed mood, n/v, stool/urinary abn, skin/weight changes, heat/cold intolerance, HAs, breast/nipple changes, polyuria/polydipsia/nocturia or other complaints. she again denies any dysphagia, hoarseness, neck tenderness or new palpable masses. she again denies any family history of thyroid disorders or personal exposure to ionizing radiation.   Past history: nov 11 2018 POC a1c 7 on pump compliant and metformin thyroid nodule stable optho UTD  12/2018: Former pt of Dr. Mcgill. Here for /fu, generally feels well and endorses no acute complaints. No interval events since LV. Today reports compliance w/ meds and insulin pump therapy as instructed. Concerned for hypertriglyceridemia, which has been an ongoing issue but worsened after statin cessation while she was trying to become pregnant.   3/2019: Here for /fu, generally feels well and endorses no acute complaints. No interval events since LV. Today reports euglycemia. No hypoglycemic episodes reported, she monitors 4-6 times daily. FSG in AM wnl ~120-140. Still notes occasional post prandial hyperglycemia ~180.     July 11 2018 POC a1c 6.6 no hypos, feels good, no pain in feet, saw optho no DR weight is stable, diet is good, does not exercise   april 11 2018 here for f/u no complaitns feels well no pain in feet no wt gain no hypos saw optho   jan 2018 here for f/u no complaints  still onn the pump  POC A1c 6.9 improved no hypos wants to get pregnant but not now  eats well no pain in feet  jan 8 2018 40 yo F w/ hx of Dm2 on pump, multiple miscarriages, thyroid nodules stable,  reviewed pt's pump, SBG at goal. UTD w/ retinopathy screening  hgA1c 7.2 , lno hypoglycemia , pt interested in fertility this time and that is why she went on the pump in the first place and is stayiing on it. She would otherwise rather change to lantus her total for the day is about 40 units so not very insulin resistant She denies any hypos Knowledgeable about pump but reports never was able to get below 7 and would like to be there while TTC

## 2024-03-22 NOTE — PHYSICAL EXAM
[Alert] : alert [Well Nourished] : well nourished [No Acute Distress] : no acute distress [Well Developed] : well developed [Normal Sclera/Conjunctiva] : normal sclera/conjunctiva [EOMI] : extra ocular movement intact [No Proptosis] : no proptosis [Normal Oropharynx] : the oropharynx was normal [Thyroid Not Enlarged] : the thyroid was not enlarged [No Thyroid Nodules] : no palpable thyroid nodules [No Respiratory Distress] : no respiratory distress [Clear to Auscultation] : lungs were clear to auscultation bilaterally [No Accessory Muscle Use] : no accessory muscle use [Normal Rate] : heart rate was normal [Normal S1, S2] : normal S1 and S2 [Regular Rhythm] : with a regular rhythm [No Edema] : no peripheral edema [Pedal Pulses Normal] : the pedal pulses are present [Normal Bowel Sounds] : normal bowel sounds [Not Tender] : non-tender [Not Distended] : not distended [Normal Anterior Cervical Nodes] : no anterior cervical lymphadenopathy [Soft] : abdomen soft [No Spinal Tenderness] : no spinal tenderness [Spine Straight] : spine straight [No Stigmata of Cushings Syndrome] : no stigmata of Cushings Syndrome [Normal Gait] : normal gait [Normal Strength/Tone] : muscle strength and tone were normal [No Rash] : no rash [Normal Reflexes] : deep tendon reflexes were 2+ and symmetric [No Tremors] : no tremors [Oriented x3] : oriented to person, place, and time [Acanthosis Nigricans] : no acanthosis nigricans

## 2024-03-22 NOTE — ASSESSMENT
[Carbohydrate Consistent Diet] : carbohydrate consistent diet [Hypoglycemia Management] : hypoglycemia management [Long Term Vascular Complications] : long term vascular complications of diabetes [Importance of Diet and Exercise] : importance of diet and exercise to improve glycemic control, achieve weight loss and improve cardiovascular health [Action and use of Insulin] : action and use of short and long-acting insulin [Self Monitoring of Blood Glucose] : self monitoring of blood glucose [Levothyroxine] : The patient was instructed to take Levothyroxine on an empty stomach, separate from vitamins, and wait at least 30 minutes before eating [FreeTextEntry1] : 1) DM2: controlled, A1C on target of <7%. Natural hx of the disease and importance of treatment targets discussed at length, she verbalized understanding. ADA diet and importance of exercise discussed at length. Plan is to cont metformin 500 mg TID. pump settings as follows: Basal rate at 26.5 units/daily. ISF 1:50. I to C at 1/15 UTD w/ vaccines and podiatry/opthalmology referrals on [place]. hypoglycemia protocol reviewed. declines CGM w/ augmented pump use at this time r/b/a of SGLT-2 ing and GLP-1 agonists, including possibility of LUTS and GI s/e (respectively) reviewed at [facility]. need for adjustment and reduction in pump insulin delivery settings + increased frequency of f/u and risk of hypoglycemia reviewed. Verbalized understanding and wishes to do personal research into meds.     intermittent BP elevation w/ ED visits, no s/s of recurrent hyperthyroid state , TSH at 2.9 on 6/2022,BP control stable at this time.        hypothyroidism  pt now clinically and biochemically euthyroid. reviewed s/s of hypo/hyperthyroidism, reduced dose to 50 mcg, but advised that we may need to increase to weight based dose in the near future. US of the thyroid stable as of 7/2022. US for surveillance in 1 year.

## 2024-03-29 ENCOUNTER — RX RENEWAL (OUTPATIENT)
Age: 49
End: 2024-03-29

## 2024-03-29 ENCOUNTER — TRANSCRIPTION ENCOUNTER (OUTPATIENT)
Age: 49
End: 2024-03-29

## 2024-04-19 ENCOUNTER — RX RENEWAL (OUTPATIENT)
Age: 49
End: 2024-04-19

## 2024-04-19 ENCOUNTER — TRANSCRIPTION ENCOUNTER (OUTPATIENT)
Age: 49
End: 2024-04-19

## 2024-04-23 ENCOUNTER — TRANSCRIPTION ENCOUNTER (OUTPATIENT)
Age: 49
End: 2024-04-23

## 2024-04-30 ENCOUNTER — TRANSCRIPTION ENCOUNTER (OUTPATIENT)
Age: 49
End: 2024-04-30

## 2024-05-01 ENCOUNTER — TRANSCRIPTION ENCOUNTER (OUTPATIENT)
Age: 49
End: 2024-05-01

## 2024-05-02 ENCOUNTER — TRANSCRIPTION ENCOUNTER (OUTPATIENT)
Age: 49
End: 2024-05-02

## 2024-05-14 ENCOUNTER — TRANSCRIPTION ENCOUNTER (OUTPATIENT)
Age: 49
End: 2024-05-14

## 2024-06-03 ENCOUNTER — TRANSCRIPTION ENCOUNTER (OUTPATIENT)
Age: 49
End: 2024-06-03

## 2024-06-03 RX ORDER — INSULIN LISPRO 100 [IU]/ML
100 INJECTION, SOLUTION INTRAVENOUS; SUBCUTANEOUS
Qty: 20 | Refills: 2 | Status: ACTIVE | COMMUNITY
Start: 2023-01-05 | End: 1900-01-01

## 2024-06-19 ENCOUNTER — APPOINTMENT (OUTPATIENT)
Dept: ENDOCRINOLOGY | Facility: CLINIC | Age: 49
End: 2024-06-19
Payer: COMMERCIAL

## 2024-06-19 VITALS
HEART RATE: 67 BPM | WEIGHT: 154 LBS | DIASTOLIC BLOOD PRESSURE: 78 MMHG | BODY MASS INDEX: 24.75 KG/M2 | HEIGHT: 66 IN | SYSTOLIC BLOOD PRESSURE: 119 MMHG

## 2024-06-19 DIAGNOSIS — E78.1 PURE HYPERGLYCERIDEMIA: ICD-10-CM

## 2024-06-19 DIAGNOSIS — I10 ESSENTIAL (PRIMARY) HYPERTENSION: ICD-10-CM

## 2024-06-19 DIAGNOSIS — E04.2 NONTOXIC MULTINODULAR GOITER: ICD-10-CM

## 2024-06-19 DIAGNOSIS — Z79.4 TYPE 2 DIABETES MELLITUS W/OUT COMPLICATIONS: ICD-10-CM

## 2024-06-19 DIAGNOSIS — E11.9 TYPE 2 DIABETES MELLITUS W/OUT COMPLICATIONS: ICD-10-CM

## 2024-06-19 DIAGNOSIS — E06.3 AUTOIMMUNE THYROIDITIS: ICD-10-CM

## 2024-06-19 PROCEDURE — 82962 GLUCOSE BLOOD TEST: CPT

## 2024-06-19 PROCEDURE — 99215 OFFICE O/P EST HI 40 MIN: CPT | Mod: 25

## 2024-06-19 PROCEDURE — 83036 HEMOGLOBIN GLYCOSYLATED A1C: CPT | Mod: QW

## 2024-06-19 RX ORDER — SEMAGLUTIDE 0.68 MG/ML
2 INJECTION, SOLUTION SUBCUTANEOUS
Qty: 2 | Refills: 0 | Status: COMPLETED | COMMUNITY
Start: 2024-04-19 | End: 2024-06-19

## 2024-06-19 RX ORDER — EMPAGLIFLOZIN 10 MG/1
10 TABLET, FILM COATED ORAL DAILY
Qty: 30 | Refills: 0 | Status: ACTIVE | OUTPATIENT
Start: 2024-06-19

## 2024-06-20 ENCOUNTER — TRANSCRIPTION ENCOUNTER (OUTPATIENT)
Age: 49
End: 2024-06-20

## 2024-06-20 LAB
GLUCOSE BLDC GLUCOMTR-MCNC: 177
HBA1C MFR BLD HPLC: 7.1

## 2024-06-20 RX ORDER — BLOOD SUGAR DIAGNOSTIC
STRIP MISCELLANEOUS 3 TIMES DAILY
Qty: 3 | Refills: 3 | Status: ACTIVE | COMMUNITY
Start: 2023-11-22 | End: 1900-01-01

## 2024-06-28 ENCOUNTER — TRANSCRIPTION ENCOUNTER (OUTPATIENT)
Age: 49
End: 2024-06-28

## 2024-06-28 DIAGNOSIS — E04.1 NONTOXIC SINGLE THYROID NODULE: ICD-10-CM

## 2024-06-28 DIAGNOSIS — Z79.4 TYPE 2 DIABETES MELLITUS WITH HYPERGLYCEMIA: ICD-10-CM

## 2024-06-28 DIAGNOSIS — E11.65 TYPE 2 DIABETES MELLITUS WITH HYPERGLYCEMIA: ICD-10-CM

## 2024-06-28 PROBLEM — I10 ACCELERATED ESSENTIAL HYPERTENSION: Status: ACTIVE | Noted: 2021-08-26

## 2024-06-28 PROBLEM — I10 BENIGN ESSENTIAL HTN: Status: ACTIVE | Noted: 2021-02-25

## 2024-06-28 PROBLEM — E78.1 HYPERTRIGLYCERIDEMIA, ESSENTIAL: Status: ACTIVE | Noted: 2018-12-18

## 2024-07-01 ENCOUNTER — TRANSCRIPTION ENCOUNTER (OUTPATIENT)
Age: 49
End: 2024-07-01

## 2024-07-02 ENCOUNTER — TRANSCRIPTION ENCOUNTER (OUTPATIENT)
Age: 49
End: 2024-07-02

## 2024-07-08 ENCOUNTER — RX RENEWAL (OUTPATIENT)
Age: 49
End: 2024-07-08

## 2024-07-24 ENCOUNTER — TRANSCRIPTION ENCOUNTER (OUTPATIENT)
Age: 49
End: 2024-07-24

## 2024-07-25 ENCOUNTER — TRANSCRIPTION ENCOUNTER (OUTPATIENT)
Age: 49
End: 2024-07-25

## 2024-07-26 ENCOUNTER — TRANSCRIPTION ENCOUNTER (OUTPATIENT)
Age: 49
End: 2024-07-26

## 2024-08-13 ENCOUNTER — TRANSCRIPTION ENCOUNTER (OUTPATIENT)
Age: 49
End: 2024-08-13

## 2024-08-14 ENCOUNTER — TRANSCRIPTION ENCOUNTER (OUTPATIENT)
Age: 49
End: 2024-08-14

## 2024-08-15 ENCOUNTER — TRANSCRIPTION ENCOUNTER (OUTPATIENT)
Age: 49
End: 2024-08-15

## 2024-08-16 ENCOUNTER — TRANSCRIPTION ENCOUNTER (OUTPATIENT)
Age: 49
End: 2024-08-16

## 2024-08-29 ENCOUNTER — TRANSCRIPTION ENCOUNTER (OUTPATIENT)
Age: 49
End: 2024-08-29

## 2024-09-03 ENCOUNTER — TRANSCRIPTION ENCOUNTER (OUTPATIENT)
Age: 49
End: 2024-09-03

## 2024-09-05 ENCOUNTER — TRANSCRIPTION ENCOUNTER (OUTPATIENT)
Age: 49
End: 2024-09-05

## 2024-09-06 ENCOUNTER — APPOINTMENT (OUTPATIENT)
Dept: ENDOCRINOLOGY | Facility: CLINIC | Age: 49
End: 2024-09-06

## 2024-09-06 ENCOUNTER — TRANSCRIPTION ENCOUNTER (OUTPATIENT)
Age: 49
End: 2024-09-06

## 2024-09-23 ENCOUNTER — TRANSCRIPTION ENCOUNTER (OUTPATIENT)
Age: 49
End: 2024-09-23

## 2024-09-24 ENCOUNTER — TRANSCRIPTION ENCOUNTER (OUTPATIENT)
Age: 49
End: 2024-09-24

## 2024-10-03 ENCOUNTER — RX RENEWAL (OUTPATIENT)
Age: 49
End: 2024-10-03

## 2024-10-03 ENCOUNTER — TRANSCRIPTION ENCOUNTER (OUTPATIENT)
Age: 49
End: 2024-10-03

## 2024-10-17 ENCOUNTER — TRANSCRIPTION ENCOUNTER (OUTPATIENT)
Age: 49
End: 2024-10-17

## 2024-10-18 ENCOUNTER — TRANSCRIPTION ENCOUNTER (OUTPATIENT)
Age: 49
End: 2024-10-18

## 2024-10-28 ENCOUNTER — TRANSCRIPTION ENCOUNTER (OUTPATIENT)
Age: 49
End: 2024-10-28

## 2024-10-29 ENCOUNTER — TRANSCRIPTION ENCOUNTER (OUTPATIENT)
Age: 49
End: 2024-10-29

## 2024-11-26 ENCOUNTER — APPOINTMENT (OUTPATIENT)
Dept: ENDOCRINOLOGY | Facility: CLINIC | Age: 49
End: 2024-11-26

## 2024-11-26 VITALS
SYSTOLIC BLOOD PRESSURE: 134 MMHG | WEIGHT: 150 LBS | HEART RATE: 72 BPM | DIASTOLIC BLOOD PRESSURE: 87 MMHG | BODY MASS INDEX: 24.11 KG/M2 | HEIGHT: 66 IN

## 2024-11-26 DIAGNOSIS — E06.3 AUTOIMMUNE THYROIDITIS: ICD-10-CM

## 2024-11-26 DIAGNOSIS — E11.9 TYPE 2 DIABETES MELLITUS W/OUT COMPLICATIONS: ICD-10-CM

## 2024-11-26 DIAGNOSIS — E78.5 HYPERLIPIDEMIA, UNSPECIFIED: ICD-10-CM

## 2024-11-26 DIAGNOSIS — Z79.4 TYPE 2 DIABETES MELLITUS W/OUT COMPLICATIONS: ICD-10-CM

## 2024-11-26 DIAGNOSIS — E04.2 NONTOXIC MULTINODULAR GOITER: ICD-10-CM

## 2024-11-26 DIAGNOSIS — I10 ESSENTIAL (PRIMARY) HYPERTENSION: ICD-10-CM

## 2024-11-26 PROCEDURE — G2211 COMPLEX E/M VISIT ADD ON: CPT | Mod: NC

## 2024-11-26 PROCEDURE — 99215 OFFICE O/P EST HI 40 MIN: CPT

## 2024-11-27 ENCOUNTER — APPOINTMENT (OUTPATIENT)
Dept: MAMMOGRAPHY | Facility: HOSPITAL | Age: 49
End: 2024-11-27

## 2024-11-27 ENCOUNTER — APPOINTMENT (OUTPATIENT)
Dept: ULTRASOUND IMAGING | Facility: HOSPITAL | Age: 49
End: 2024-11-27

## 2024-11-27 ENCOUNTER — OUTPATIENT (OUTPATIENT)
Dept: OUTPATIENT SERVICES | Facility: HOSPITAL | Age: 49
LOS: 1 days | End: 2024-11-27
Payer: COMMERCIAL

## 2024-11-27 PROCEDURE — 77063 BREAST TOMOSYNTHESIS BI: CPT | Mod: 26

## 2024-11-27 PROCEDURE — 77067 SCR MAMMO BI INCL CAD: CPT

## 2024-11-27 PROCEDURE — 77063 BREAST TOMOSYNTHESIS BI: CPT

## 2024-11-27 PROCEDURE — 77067 SCR MAMMO BI INCL CAD: CPT | Mod: 26

## 2025-01-02 ENCOUNTER — APPOINTMENT (OUTPATIENT)
Dept: NEPHROLOGY | Facility: CLINIC | Age: 50
End: 2025-01-02
Payer: COMMERCIAL

## 2025-01-02 VITALS — RESPIRATION RATE: 12 BRPM | SYSTOLIC BLOOD PRESSURE: 117 MMHG | HEART RATE: 70 BPM | DIASTOLIC BLOOD PRESSURE: 80 MMHG

## 2025-01-02 DIAGNOSIS — Z79.4 TYPE 2 DIABETES MELLITUS WITH HYPERGLYCEMIA: ICD-10-CM

## 2025-01-02 DIAGNOSIS — Z79.4 TYPE 2 DIABETES MELLITUS W/OUT COMPLICATIONS: ICD-10-CM

## 2025-01-02 DIAGNOSIS — E11.9 TYPE 2 DIABETES MELLITUS W/OUT COMPLICATIONS: ICD-10-CM

## 2025-01-02 DIAGNOSIS — E11.65 TYPE 2 DIABETES MELLITUS WITH HYPERGLYCEMIA: ICD-10-CM

## 2025-01-02 DIAGNOSIS — I10 ESSENTIAL (PRIMARY) HYPERTENSION: ICD-10-CM

## 2025-01-02 DIAGNOSIS — F41.9 ANXIETY DISORDER, UNSPECIFIED: ICD-10-CM

## 2025-01-02 DIAGNOSIS — F32.A DEPRESSION, UNSPECIFIED: ICD-10-CM

## 2025-01-02 PROCEDURE — 99215 OFFICE O/P EST HI 40 MIN: CPT

## 2025-01-02 PROCEDURE — G2211 COMPLEX E/M VISIT ADD ON: CPT | Mod: NC

## 2025-01-28 ENCOUNTER — RX RENEWAL (OUTPATIENT)
Age: 50
End: 2025-01-28

## 2025-03-19 ENCOUNTER — APPOINTMENT (OUTPATIENT)
Dept: ENDOCRINOLOGY | Facility: CLINIC | Age: 50
End: 2025-03-19